# Patient Record
Sex: FEMALE | Race: WHITE | Employment: UNEMPLOYED | URBAN - METROPOLITAN AREA
[De-identification: names, ages, dates, MRNs, and addresses within clinical notes are randomized per-mention and may not be internally consistent; named-entity substitution may affect disease eponyms.]

---

## 2019-05-28 ENCOUNTER — HOSPITAL ENCOUNTER (INPATIENT)
Age: 64
LOS: 3 days | Discharge: NURSING, REHAB OR LONG-TERM CARE | DRG: 426 | End: 2019-05-31
Attending: INTERNAL MEDICINE | Admitting: INTERNAL MEDICINE
Payer: MEDICAID

## 2019-05-28 ENCOUNTER — APPOINTMENT (OUTPATIENT)
Dept: CT IMAGING | Age: 64
DRG: 426 | End: 2019-05-28
Payer: MEDICAID

## 2019-05-28 DIAGNOSIS — E87.1 HYPONATREMIA: Primary | ICD-10-CM

## 2019-05-28 DIAGNOSIS — S09.90XA CLOSED HEAD INJURY, INITIAL ENCOUNTER: ICD-10-CM

## 2019-05-28 DIAGNOSIS — K72.00 ACUTE LIVER FAILURE WITHOUT HEPATIC COMA: ICD-10-CM

## 2019-05-28 PROBLEM — K21.9 GERD (GASTROESOPHAGEAL REFLUX DISEASE): Status: ACTIVE | Noted: 2019-05-28

## 2019-05-28 PROBLEM — F10.10 ALCOHOL ABUSE: Status: ACTIVE | Noted: 2019-05-28

## 2019-05-28 PROBLEM — R79.89 LFTS ABNORMAL: Status: ACTIVE | Noted: 2019-05-28

## 2019-05-28 PROBLEM — I10 ESSENTIAL HYPERTENSION: Status: ACTIVE | Noted: 2019-05-28

## 2019-05-28 LAB
ALBUMIN SERPL-MCNC: 2.7 G/DL (ref 3.5–4.6)
ALP BLD-CCNC: 538 U/L (ref 40–130)
ALT SERPL-CCNC: 129 U/L (ref 0–33)
ANION GAP SERPL CALCULATED.3IONS-SCNC: 19 MEQ/L (ref 9–15)
ANISOCYTOSIS: ABNORMAL
APTT: 28.2 SEC (ref 21.6–35.4)
AST SERPL-CCNC: 142 U/L (ref 0–35)
BASOPHILS ABSOLUTE: 0 K/UL (ref 0–0.2)
BASOPHILS RELATIVE PERCENT: 0.7 %
BILIRUB SERPL-MCNC: 6.2 MG/DL (ref 0.2–0.7)
BILIRUBIN DIRECT: 4.4 MG/DL (ref 0–0.4)
BILIRUBIN, INDIRECT: 1.8 MG/DL (ref 0–0.6)
BUN BLDV-MCNC: 7 MG/DL (ref 8–23)
CALCIUM SERPL-MCNC: 8.3 MG/DL (ref 8.5–9.9)
CHLORIDE BLD-SCNC: 81 MEQ/L (ref 95–107)
CO2: 18 MEQ/L (ref 20–31)
CREAT SERPL-MCNC: 0.28 MG/DL (ref 0.5–0.9)
EKG ATRIAL RATE: 77 BPM
EKG P AXIS: 52 DEGREES
EKG P-R INTERVAL: 168 MS
EKG Q-T INTERVAL: 426 MS
EKG QRS DURATION: 104 MS
EKG QTC CALCULATION (BAZETT): 482 MS
EKG R AXIS: 72 DEGREES
EKG T AXIS: 53 DEGREES
EKG VENTRICULAR RATE: 77 BPM
EOSINOPHILS ABSOLUTE: 0.1 K/UL (ref 0–0.7)
EOSINOPHILS RELATIVE PERCENT: 2.2 %
ETHANOL PERCENT: 0.07 G/DL
ETHANOL: 74 MG/DL (ref 0–0.08)
GFR AFRICAN AMERICAN: >60
GFR NON-AFRICAN AMERICAN: >60
GLOBULIN: 3.2 G/DL (ref 2.3–3.5)
GLUCOSE BLD-MCNC: 93 MG/DL (ref 70–99)
HAV IGM SER IA-ACNC: NORMAL
HCT VFR BLD CALC: 30.1 % (ref 37–47)
HEMOGLOBIN: 10.3 G/DL (ref 12–16)
HEPATITIS B CORE IGM ANTIBODY: NORMAL
HEPATITIS B SURFACE ANTIGEN INTERPRETATION: NORMAL
HEPATITIS C ANTIBODY INTERPRETATION: NORMAL
HEPATITIS INTERPRETATION:: NORMAL
INR BLD: 1.2
LYMPHOCYTES ABSOLUTE: 0.6 K/UL (ref 1–4.8)
LYMPHOCYTES RELATIVE PERCENT: 18.2 %
MACROCYTES: ABNORMAL
MAGNESIUM: 1.7 MG/DL (ref 1.7–2.4)
MCH RBC QN AUTO: 38 PG (ref 27–31.3)
MCHC RBC AUTO-ENTMCNC: 34.1 % (ref 33–37)
MCV RBC AUTO: 111.5 FL (ref 82–100)
MONOCYTES ABSOLUTE: 0.6 K/UL (ref 0.2–0.8)
MONOCYTES RELATIVE PERCENT: 18 %
NEUTROPHILS ABSOLUTE: 2.1 K/UL (ref 1.4–6.5)
NEUTROPHILS RELATIVE PERCENT: 60.9 %
OSMOLALITY: 281 MOSM/KG (ref 280–303)
PDW BLD-RTO: 20.3 % (ref 11.5–14.5)
PLATELET # BLD: 178 K/UL (ref 130–400)
POTASSIUM SERPL-SCNC: 4.5 MEQ/L (ref 3.4–4.9)
PROTHROMBIN TIME: 11.8 SEC (ref 9–11.5)
RBC # BLD: 2.7 M/UL (ref 4.2–5.4)
SLIDE REVIEW: ABNORMAL
SODIUM BLD-SCNC: 118 MEQ/L (ref 135–144)
TOTAL PROTEIN: 5.9 G/DL (ref 6.3–8)
WBC # BLD: 3.4 K/UL (ref 4.8–10.8)

## 2019-05-28 PROCEDURE — 96374 THER/PROPH/DIAG INJ IV PUSH: CPT

## 2019-05-28 PROCEDURE — 36415 COLL VENOUS BLD VENIPUNCTURE: CPT

## 2019-05-28 PROCEDURE — 12032 INTMD RPR S/A/T/EXT 2.6-7.5: CPT

## 2019-05-28 PROCEDURE — 80074 ACUTE HEPATITIS PANEL: CPT

## 2019-05-28 PROCEDURE — 82570 ASSAY OF URINE CREATININE: CPT

## 2019-05-28 PROCEDURE — 85730 THROMBOPLASTIN TIME PARTIAL: CPT

## 2019-05-28 PROCEDURE — G0480 DRUG TEST DEF 1-7 CLASSES: HCPCS

## 2019-05-28 PROCEDURE — 80053 COMPREHEN METABOLIC PANEL: CPT

## 2019-05-28 PROCEDURE — 83735 ASSAY OF MAGNESIUM: CPT

## 2019-05-28 PROCEDURE — 93005 ELECTROCARDIOGRAM TRACING: CPT | Performed by: PHYSICIAN ASSISTANT

## 2019-05-28 PROCEDURE — 82247 BILIRUBIN TOTAL: CPT

## 2019-05-28 PROCEDURE — 2580000003 HC RX 258: Performed by: INTERNAL MEDICINE

## 2019-05-28 PROCEDURE — 99285 EMERGENCY DEPT VISIT HI MDM: CPT

## 2019-05-28 PROCEDURE — 85025 COMPLETE CBC W/AUTO DIFF WBC: CPT

## 2019-05-28 PROCEDURE — 2500000003 HC RX 250 WO HCPCS: Performed by: PHYSICIAN ASSISTANT

## 2019-05-28 PROCEDURE — 84300 ASSAY OF URINE SODIUM: CPT

## 2019-05-28 PROCEDURE — 72125 CT NECK SPINE W/O DYE: CPT

## 2019-05-28 PROCEDURE — 83935 ASSAY OF URINE OSMOLALITY: CPT

## 2019-05-28 PROCEDURE — 82248 BILIRUBIN DIRECT: CPT

## 2019-05-28 PROCEDURE — 6370000000 HC RX 637 (ALT 250 FOR IP): Performed by: INTERNAL MEDICINE

## 2019-05-28 PROCEDURE — 2580000003 HC RX 258: Performed by: PHYSICIAN ASSISTANT

## 2019-05-28 PROCEDURE — 0HQ0XZZ REPAIR SCALP SKIN, EXTERNAL APPROACH: ICD-10-PCS | Performed by: PHYSICIAN ASSISTANT

## 2019-05-28 PROCEDURE — 70450 CT HEAD/BRAIN W/O DYE: CPT

## 2019-05-28 PROCEDURE — 83930 ASSAY OF BLOOD OSMOLALITY: CPT

## 2019-05-28 PROCEDURE — 1210000000 HC MED SURG R&B

## 2019-05-28 PROCEDURE — 6360000002 HC RX W HCPCS: Performed by: PHYSICIAN ASSISTANT

## 2019-05-28 PROCEDURE — 85610 PROTHROMBIN TIME: CPT

## 2019-05-28 RX ORDER — POTASSIUM CHLORIDE 7.45 MG/ML
10 INJECTION INTRAVENOUS PRN
Status: DISCONTINUED | OUTPATIENT
Start: 2019-05-28 | End: 2019-05-31 | Stop reason: HOSPADM

## 2019-05-28 RX ORDER — KETOROLAC TROMETHAMINE 30 MG/ML
30 INJECTION, SOLUTION INTRAMUSCULAR; INTRAVENOUS ONCE
Status: COMPLETED | OUTPATIENT
Start: 2019-05-28 | End: 2019-05-28

## 2019-05-28 RX ORDER — LORAZEPAM 2 MG/ML
1 INJECTION INTRAMUSCULAR
Status: DISCONTINUED | OUTPATIENT
Start: 2019-05-28 | End: 2019-05-31 | Stop reason: HOSPADM

## 2019-05-28 RX ORDER — POTASSIUM CHLORIDE 20 MEQ/1
40 TABLET, EXTENDED RELEASE ORAL PRN
Status: DISCONTINUED | OUTPATIENT
Start: 2019-05-28 | End: 2019-05-31 | Stop reason: HOSPADM

## 2019-05-28 RX ORDER — METOPROLOL TARTRATE 50 MG/1
50 TABLET, FILM COATED ORAL 2 TIMES DAILY
Status: DISCONTINUED | OUTPATIENT
Start: 2019-05-28 | End: 2019-05-31 | Stop reason: HOSPADM

## 2019-05-28 RX ORDER — SODIUM CHLORIDE 0.9 % (FLUSH) 0.9 %
10 SYRINGE (ML) INJECTION PRN
Status: DISCONTINUED | OUTPATIENT
Start: 2019-05-28 | End: 2019-05-31 | Stop reason: HOSPADM

## 2019-05-28 RX ORDER — LORAZEPAM 1 MG/1
4 TABLET ORAL
Status: DISCONTINUED | OUTPATIENT
Start: 2019-05-28 | End: 2019-05-31 | Stop reason: HOSPADM

## 2019-05-28 RX ORDER — CYCLOBENZAPRINE HCL 10 MG
10 TABLET ORAL 3 TIMES DAILY PRN
Status: DISCONTINUED | OUTPATIENT
Start: 2019-05-28 | End: 2019-05-31 | Stop reason: HOSPADM

## 2019-05-28 RX ORDER — METOPROLOL TARTRATE 50 MG/1
50 TABLET, FILM COATED ORAL DAILY
Status: ON HOLD | COMMUNITY
End: 2019-09-12 | Stop reason: HOSPADM

## 2019-05-28 RX ORDER — MULTIVITAMIN WITH FOLIC ACID 400 MCG
1 TABLET ORAL DAILY
Status: DISCONTINUED | OUTPATIENT
Start: 2019-05-28 | End: 2019-05-31 | Stop reason: HOSPADM

## 2019-05-28 RX ORDER — LORAZEPAM 1 MG/1
1 TABLET ORAL
Status: DISCONTINUED | OUTPATIENT
Start: 2019-05-28 | End: 2019-05-31 | Stop reason: HOSPADM

## 2019-05-28 RX ORDER — SODIUM CHLORIDE 0.9 % (FLUSH) 0.9 %
10 SYRINGE (ML) INJECTION EVERY 12 HOURS SCHEDULED
Status: DISCONTINUED | OUTPATIENT
Start: 2019-05-28 | End: 2019-05-31 | Stop reason: HOSPADM

## 2019-05-28 RX ORDER — ONDANSETRON 2 MG/ML
4 INJECTION INTRAMUSCULAR; INTRAVENOUS EVERY 6 HOURS PRN
Status: DISCONTINUED | OUTPATIENT
Start: 2019-05-28 | End: 2019-05-31 | Stop reason: HOSPADM

## 2019-05-28 RX ORDER — LIDOCAINE HYDROCHLORIDE AND EPINEPHRINE 10; 10 MG/ML; UG/ML
20 INJECTION, SOLUTION INFILTRATION; PERINEURAL ONCE
Status: DISCONTINUED | OUTPATIENT
Start: 2019-05-28 | End: 2019-05-31 | Stop reason: HOSPADM

## 2019-05-28 RX ORDER — MAGNESIUM SULFATE 1 G/100ML
1 INJECTION INTRAVENOUS PRN
Status: DISCONTINUED | OUTPATIENT
Start: 2019-05-28 | End: 2019-05-31 | Stop reason: HOSPADM

## 2019-05-28 RX ORDER — LORAZEPAM 2 MG/ML
4 INJECTION INTRAMUSCULAR
Status: DISCONTINUED | OUTPATIENT
Start: 2019-05-28 | End: 2019-05-31 | Stop reason: HOSPADM

## 2019-05-28 RX ORDER — MECLIZINE HCL 12.5 MG/1
12.5 TABLET ORAL 3 TIMES DAILY PRN
COMMUNITY

## 2019-05-28 RX ORDER — LEVOTHYROXINE SODIUM 0.15 MG/1
150 TABLET ORAL DAILY
COMMUNITY

## 2019-05-28 RX ORDER — LIDOCAINE HYDROCHLORIDE AND EPINEPHRINE BITARTRATE 20; .01 MG/ML; MG/ML
20 INJECTION, SOLUTION SUBCUTANEOUS ONCE
Status: COMPLETED | OUTPATIENT
Start: 2019-05-28 | End: 2019-05-28

## 2019-05-28 RX ORDER — 0.9 % SODIUM CHLORIDE 0.9 %
1000 INTRAVENOUS SOLUTION INTRAVENOUS ONCE
Status: COMPLETED | OUTPATIENT
Start: 2019-05-28 | End: 2019-05-29

## 2019-05-28 RX ORDER — PANTOPRAZOLE SODIUM 40 MG/1
40 TABLET, DELAYED RELEASE ORAL
Status: DISCONTINUED | OUTPATIENT
Start: 2019-05-29 | End: 2019-05-31 | Stop reason: HOSPADM

## 2019-05-28 RX ORDER — THIAMINE MONONITRATE (VIT B1) 100 MG
100 TABLET ORAL DAILY
Status: DISCONTINUED | OUTPATIENT
Start: 2019-05-28 | End: 2019-05-31 | Stop reason: HOSPADM

## 2019-05-28 RX ORDER — LORAZEPAM 1 MG/1
2 TABLET ORAL
Status: DISCONTINUED | OUTPATIENT
Start: 2019-05-28 | End: 2019-05-31 | Stop reason: HOSPADM

## 2019-05-28 RX ORDER — LORAZEPAM 1 MG/1
3 TABLET ORAL
Status: DISCONTINUED | OUTPATIENT
Start: 2019-05-28 | End: 2019-05-31 | Stop reason: HOSPADM

## 2019-05-28 RX ORDER — CYCLOBENZAPRINE HCL 5 MG
5 TABLET ORAL 3 TIMES DAILY PRN
Status: ON HOLD | COMMUNITY
End: 2019-08-10 | Stop reason: SDUPTHER

## 2019-05-28 RX ORDER — PANTOPRAZOLE SODIUM 40 MG/1
40 TABLET, DELAYED RELEASE ORAL
COMMUNITY

## 2019-05-28 RX ORDER — ASPIRIN 325 MG
325 TABLET ORAL DAILY
COMMUNITY

## 2019-05-28 RX ORDER — FOLIC ACID 1 MG/1
1 TABLET ORAL DAILY
Status: DISCONTINUED | OUTPATIENT
Start: 2019-05-28 | End: 2019-05-31 | Stop reason: HOSPADM

## 2019-05-28 RX ORDER — LORAZEPAM 2 MG/ML
3 INJECTION INTRAMUSCULAR
Status: DISCONTINUED | OUTPATIENT
Start: 2019-05-28 | End: 2019-05-31 | Stop reason: HOSPADM

## 2019-05-28 RX ORDER — POTASSIUM CHLORIDE 1.5 G/1.77G
40 POWDER, FOR SOLUTION ORAL PRN
Status: DISCONTINUED | OUTPATIENT
Start: 2019-05-28 | End: 2019-05-31 | Stop reason: HOSPADM

## 2019-05-28 RX ORDER — LORAZEPAM 2 MG/ML
2 INJECTION INTRAMUSCULAR
Status: DISCONTINUED | OUTPATIENT
Start: 2019-05-28 | End: 2019-05-31 | Stop reason: HOSPADM

## 2019-05-28 RX ADMIN — LIDOCAINE HYDROCHLORIDE,EPINEPHRINE BITARTRATE 20 ML: 20; .01 INJECTION, SOLUTION INFILTRATION; PERINEURAL at 17:30

## 2019-05-28 RX ADMIN — FOLIC ACID 1 MG: 1 TABLET ORAL at 20:39

## 2019-05-28 RX ADMIN — KETOROLAC TROMETHAMINE 30 MG: 30 INJECTION, SOLUTION INTRAMUSCULAR; INTRAVENOUS at 16:29

## 2019-05-28 RX ADMIN — SODIUM CHLORIDE 1000 ML: 9 INJECTION, SOLUTION INTRAVENOUS at 16:29

## 2019-05-28 RX ADMIN — Medication 10 ML: at 20:51

## 2019-05-28 RX ADMIN — Medication 100 MG: at 20:42

## 2019-05-28 RX ADMIN — METOPROLOL TARTRATE 50 MG: 50 TABLET ORAL at 20:39

## 2019-05-28 RX ADMIN — FOLIC ACID: 5 INJECTION, SOLUTION INTRAMUSCULAR; INTRAVENOUS; SUBCUTANEOUS at 16:29

## 2019-05-28 ASSESSMENT — ENCOUNTER SYMPTOMS
ALLERGIC/IMMUNOLOGIC NEGATIVE: 1
APNEA: 0
VOMITING: 0
BACK PAIN: 1
ABDOMINAL PAIN: 0
DIARRHEA: 0
COLOR CHANGE: 0
EYE PAIN: 0
SHORTNESS OF BREATH: 0
TROUBLE SWALLOWING: 0

## 2019-05-28 ASSESSMENT — PAIN DESCRIPTION - LOCATION
LOCATION: HEAD
LOCATION: HEAD

## 2019-05-28 ASSESSMENT — PAIN SCALES - GENERAL
PAINLEVEL_OUTOF10: 3
PAINLEVEL_OUTOF10: 7
PAINLEVEL_OUTOF10: 6
PAINLEVEL_OUTOF10: 7
PAINLEVEL_OUTOF10: 6

## 2019-05-28 NOTE — ED NOTES
All labs sent but CBCAD. Called lab, spoke with Shauna Conn to get lab draw.       Cristina Harper V RN  05/28/19 [id number]

## 2019-05-28 NOTE — ED PROVIDER NOTES
[facility] ED  eMERGENCYdEPARTMENT eNCOUnter      Pt Name: Sana Albarran  MRN: [id number]  [place] 1955  Date of evaluation: 5/28/2019  Provider:David Deboraha Snellen, PA-C    CHIEF COMPLAINT       Chief Complaint   Patient presents with    Head Injury     fall          HISTORY OF PRESENT ILLNESS  (Location/Symptom, Timing/Onset, Context/Setting, Quality, Duration, Modifying Factors, Severity.)   Sana Albarran is a 59 y.o. female who presents to the emergency department status post fall prior to arrival.  Patient states that she was walking and had a \"dizzy spell\" causing her to fall backwards and striking the back of her head. Patient does not believe that she lost consciousness but says \"who really knows\". Patient does state that she's been having dizzy spells like this for several months and has been evaluated by her family doctor for them and is supposed to be taking meclizine but has not been taking it. The patient denies any chest pain, palpitations or shortness of breath. Patient denies any neck pain. Patient does state that she is having back pain but states that this is a chronic condition for her and denies any new or changing pain to the back. No pain to the extremities. No saddle paresthesias or loss of bowel or bladder control. Rates her headache at a 6 out of 10 currently but denies any nausea or dizziness currently. HPI    Nursing Notes were reviewed and I agree. REVIEW OF SYSTEMS    (2-9 systems for level 4, 10 or more for level 5)     Review of Systems   Constitutional: Negative for diaphoresis and fever. HENT: Negative for hearing loss and trouble swallowing. Eyes: Negative for pain. Respiratory: Negative for apnea and shortness of breath. Cardiovascular: Negative for chest pain. Gastrointestinal: Negative for abdominal pain, diarrhea and vomiting. Endocrine: Negative. Genitourinary: Negative for hematuria. Musculoskeletal: Positive for back pain. Negative for neck pain and neck stiffness. Skin: Negative for color change. Allergic/Immunologic: Negative. Neurological: Positive for dizziness and headaches. Negative for numbness. Hematological: Negative. Psychiatric/Behavioral: Negative. All other systems reviewed and are negative. Except as noted above the remainder of the review of systems was reviewed and negative. PAST MEDICAL HISTORY   No past medical history on file. SURGICAL HISTORY     No past surgical history on file. CURRENT MEDICATIONS       Previous Medications    No medications on file       ALLERGIES     Patient has no known allergies. FAMILY HISTORY     No family history on file.        SOCIAL HISTORY       Social History     Socioeconomic History    Marital status:      Spouse name: Not on file    Number of children: Not on file    Years of education: Not on file    Highest education level: Not on file   Occupational History    Not on file   Social Needs    Financial resource strain: Not on file    Food insecurity:     Worry: Not on file     Inability: Not on file    Transportation needs:     Medical: Not on file     Non-medical: Not on file   Tobacco Use    Smoking status: Not on file   Substance and Sexual Activity    Alcohol use: Not on file    Drug use: Not on file    Sexual activity: Not on file   Lifestyle    Physical activity:     Days per week: Not on file     Minutes per session: Not on file    Stress: Not on file   Relationships    Social connections:     Talks on phone: Not on file     Gets together: Not on file     Attends Congregation service: Not on file     Active member of club or organization: Not on file     Attends meetings of clubs or organizations: Not on file     Relationship status: Not on file    Intimate partner violence:     Fear of current or ex partner: Not on file     Emotionally abused: Not on file     Physically abused: Not on file     Forced sexual

## 2019-05-28 NOTE — ED TRIAGE NOTES
Patient arrived to ER via squad. Per squad patient fell backwards from walking up concrete stairs. Per patient patient fell in kitchen from standing. Large laceration to back of head. Bleeding controlled. Patient is alert and oriented. Patient is a poor historian. History of dizziness per patient. Patient has multiple bruises throughout body in various stages of healing.

## 2019-05-28 NOTE — ED NOTES
Bed: 19  Expected date:   Expected time:   Means of arrival:   Comments:  53F dizziness, fall, head injury, VSS IV and labs     Paco Hardin RN  05/28/19 [id number]

## 2019-05-28 NOTE — H&P
Department of Internal Medicine  History and Physical      CHIEF COMPLAINT: Head Injury (fall )      Reason for Admission:  Hyponatremia [E87.1]  Hyponatremia [E87.1]    History Obtained From: Patient and chart review    HISTORY OF PRESENT ILLNESS:    The patient is a 59 y.o. female with significant past medical history of paroxysmal atrial fibrillation 10 years ago ( s/p ablation), GERD, hypertension who came to the ER after she fell from her wheeled walker with seat and hit back of her head on walkway. She did loose consciousness after the fall. She has laceration at back of her head. Patient was noted to be jaundiced in ER and have elevated LFT. She is chronic alcoholic for more that 30 years now and currently drinks 40-50 ounces of wine everyday. Her sister at bedside states that she is barely moving and walks less than 100 steps per day. She is also noted to have severe hyponatremia. On my interview, patient is AO x3, no chest pain or shortness of breath, no history of hematemesis or melena, abdominal pain. On reviewing her [facility] records, her LFTs had been normal until 2 months ago. She denies any fever, chills but complains of chronic dizziness    Past Medical History:    No past medical history on file. Past Surgical History:    No past surgical history on file. Medications Prior to Admission:    No medications prior to admission. Allergies:  Eggs or egg-derived products and Ursodiol    Social History:   Social History    None         Family History:   No family history on file.     REVIEW OF SYSTEMS:  12 systems reviewed and are negative except as mentioned in HPI and A&P    PHYSICAL EXAM:  Vitals:  Vitals:    05/28/19 1900   BP: 126/77   Pulse: 77   Resp: 18   Temp: 97.3 °F (36.3 °C)   SpO2: 100%       Focal exam:  Jaundiced  Icterus  Liver palpable 3 cm below right costal margin    General Exam (except as mentioned above):  CONSTITUTIONAL: Awake, alert, no apparent distress  EYES:  PERRL, conjunctiva normal  ENT:  Normocephalic, atraumatic  NECK:  Supple  BACK:  Symmetric  LUNGS:  CTAB except bilateral basilar crackles. CARDIOVASCULAR:  S1S2 present  ABDOMEN:  soft, non-distended, non-tender  MUSCULOSKELETAL:  There is no redness, warmth, or swelling of the joints. NEUROLOGIC:  Alert, awake, oriented x 3. No FND  EXTREMITIES: Warm and well perfused. DATA:  LABS  Recent Labs     05/28/19  1537   WBC 3.4*   RBC 2.70*   HGB 10.3*   HCT 30.1*   .5*   MCH 38.0*   MCHC 34.1   RDW 20.3*          Recent Labs     05/28/19  1515   *   K 4.5   CL 81*   CO2 18*   BUN 7*   CREATININE 0.28*   GLUCOSE 93   CALCIUM 8.3*       Recent Labs     05/28/19  1515   MG 1.7       ASSESSMENT AND PLAN:    Active Hospital Problems    Diagnosis Date Noted    Hyponatremia [E87.1] 05/28/2019     Priority: High    Alcohol abuse [F10.10] 05/28/2019    LFTs abnormal [R94.5] 05/28/2019    Essential hypertension [I10] 05/28/2019    GERD (gastroesophageal reflux disease) [K21.9] 05/28/2019     - Hyponatremia: patient received 2 L NS bolus in ER. Will repeat BMP and decide on further treatment plan. Will send blood and urine osmolarity, urine sodium and creatinine. Nephrology consult     - Elevated LFTs: secondary to alcohol use. Will get RUQ US . GI consult. Protonix BID. No signs or symptoms of active bleeding. Patient is afebrile and HDS    - Alcoholic liver disease: Child Suero Class B, MELD score 24.  GI consulted    - Alcohol abuse: [facility] protocol    - Fall: neuro checks every 4 hours , due to concern for LOC after fall      Hoang Uribe MD  Pager : [phone]

## 2019-05-29 ENCOUNTER — APPOINTMENT (OUTPATIENT)
Dept: ULTRASOUND IMAGING | Age: 64
DRG: 426 | End: 2019-05-29
Payer: MEDICAID

## 2019-05-29 LAB
ALBUMIN SERPL-MCNC: 2.3 G/DL (ref 3.5–4.6)
ALP BLD-CCNC: 498 U/L (ref 40–130)
ALT SERPL-CCNC: 106 U/L (ref 0–33)
ANION GAP SERPL CALCULATED.3IONS-SCNC: 11 MEQ/L (ref 9–15)
ANION GAP SERPL CALCULATED.3IONS-SCNC: 13 MEQ/L (ref 9–15)
ANISOCYTOSIS: ABNORMAL
AST SERPL-CCNC: 114 U/L (ref 0–35)
BASOPHILS ABSOLUTE: 0 K/UL (ref 0–0.2)
BASOPHILS RELATIVE PERCENT: 0.8 %
BILIRUB SERPL-MCNC: 6.1 MG/DL (ref 0.2–0.7)
BILIRUBIN DIRECT: 5.5 MG/DL (ref 0–0.4)
BILIRUBIN, INDIRECT: 0.6 MG/DL (ref 0–0.6)
BUN BLDV-MCNC: 8 MG/DL (ref 8–23)
BUN BLDV-MCNC: 8 MG/DL (ref 8–23)
CALCIUM SERPL-MCNC: 7.3 MG/DL (ref 8.5–9.9)
CALCIUM SERPL-MCNC: 7.6 MG/DL (ref 8.5–9.9)
CHLORIDE BLD-SCNC: 90 MEQ/L (ref 95–107)
CHLORIDE BLD-SCNC: 90 MEQ/L (ref 95–107)
CO2: 21 MEQ/L (ref 20–31)
CO2: 23 MEQ/L (ref 20–31)
CREAT SERPL-MCNC: 0.42 MG/DL (ref 0.5–0.9)
CREAT SERPL-MCNC: 0.44 MG/DL (ref 0.5–0.9)
CREATININE URINE: 46.4 MG/DL
EOSINOPHILS ABSOLUTE: 0.1 K/UL (ref 0–0.7)
EOSINOPHILS RELATIVE PERCENT: 2.2 %
GFR AFRICAN AMERICAN: >60
GFR AFRICAN AMERICAN: >60
GFR NON-AFRICAN AMERICAN: >60
GFR NON-AFRICAN AMERICAN: >60
GLUCOSE BLD-MCNC: 103 MG/DL (ref 70–99)
GLUCOSE BLD-MCNC: 78 MG/DL (ref 70–99)
HCT VFR BLD CALC: 27.6 % (ref 37–47)
HEMOGLOBIN: 9.5 G/DL (ref 12–16)
HYPOCHROMIA: ABNORMAL
LYMPHOCYTES ABSOLUTE: 0.8 K/UL (ref 1–4.8)
LYMPHOCYTES RELATIVE PERCENT: 26.4 %
MACROCYTES: ABNORMAL
MAGNESIUM: 1.7 MG/DL (ref 1.7–2.4)
MCH RBC QN AUTO: 38.1 PG (ref 27–31.3)
MCHC RBC AUTO-ENTMCNC: 34.4 % (ref 33–37)
MCV RBC AUTO: 110.7 FL (ref 82–100)
MONOCYTES ABSOLUTE: 0.4 K/UL (ref 0.2–0.8)
MONOCYTES RELATIVE PERCENT: 13.9 %
NEUTROPHILS ABSOLUTE: 1.8 K/UL (ref 1.4–6.5)
NEUTROPHILS RELATIVE PERCENT: 56.7 %
OSMOLALITY URINE: 177 MOSM/KG (ref 300–900)
PDW BLD-RTO: 19.9 % (ref 11.5–14.5)
PLATELET # BLD: 175 K/UL (ref 130–400)
POIKILOCYTES: ABNORMAL
POLYCHROMASIA: ABNORMAL
POTASSIUM SERPL-SCNC: 3.9 MEQ/L (ref 3.4–4.9)
POTASSIUM SERPL-SCNC: 4.7 MEQ/L (ref 3.4–4.9)
RBC # BLD: 2.5 M/UL (ref 4.2–5.4)
SODIUM BLD-SCNC: 124 MEQ/L (ref 135–144)
SODIUM BLD-SCNC: 124 MEQ/L (ref 135–144)
SODIUM URINE: <20 MEQ/L
STOMATOCYTES: ABNORMAL
TOTAL PROTEIN: 4.9 G/DL (ref 6.3–8)
WBC # BLD: 3.1 K/UL (ref 4.8–10.8)

## 2019-05-29 PROCEDURE — 93010 ELECTROCARDIOGRAM REPORT: CPT | Performed by: INTERNAL MEDICINE

## 2019-05-29 PROCEDURE — 36415 COLL VENOUS BLD VENIPUNCTURE: CPT

## 2019-05-29 PROCEDURE — 97162 PT EVAL MOD COMPLEX 30 MIN: CPT

## 2019-05-29 PROCEDURE — 2580000003 HC RX 258: Performed by: INTERNAL MEDICINE

## 2019-05-29 PROCEDURE — 97165 OT EVAL LOW COMPLEX 30 MIN: CPT

## 2019-05-29 PROCEDURE — 83735 ASSAY OF MAGNESIUM: CPT

## 2019-05-29 PROCEDURE — 85025 COMPLETE CBC W/AUTO DIFF WBC: CPT

## 2019-05-29 PROCEDURE — 99253 IP/OBS CNSLTJ NEW/EST LOW 45: CPT | Performed by: INTERNAL MEDICINE

## 2019-05-29 PROCEDURE — 1210000000 HC MED SURG R&B

## 2019-05-29 PROCEDURE — 80048 BASIC METABOLIC PNL TOTAL CA: CPT

## 2019-05-29 PROCEDURE — 76705 ECHO EXAM OF ABDOMEN: CPT

## 2019-05-29 PROCEDURE — 97530 THERAPEUTIC ACTIVITIES: CPT

## 2019-05-29 PROCEDURE — 6370000000 HC RX 637 (ALT 250 FOR IP): Performed by: INTERNAL MEDICINE

## 2019-05-29 PROCEDURE — 80076 HEPATIC FUNCTION PANEL: CPT

## 2019-05-29 RX ORDER — ACETAMINOPHEN 325 MG/1
650 TABLET ORAL EVERY 4 HOURS PRN
Status: DISCONTINUED | OUTPATIENT
Start: 2019-05-29 | End: 2019-05-31 | Stop reason: HOSPADM

## 2019-05-29 RX ADMIN — ACETAMINOPHEN 650 MG: 325 TABLET ORAL at 10:32

## 2019-05-29 RX ADMIN — Medication 10 ML: at 00:04

## 2019-05-29 RX ADMIN — Medication 10 ML: at 21:04

## 2019-05-29 RX ADMIN — FOLIC ACID 1 MG: 1 TABLET ORAL at 10:33

## 2019-05-29 RX ADMIN — PANTOPRAZOLE SODIUM 40 MG: 40 TABLET, DELAYED RELEASE ORAL at 16:07

## 2019-05-29 RX ADMIN — Medication 100 MG: at 10:32

## 2019-05-29 RX ADMIN — ACETAMINOPHEN 650 MG: 325 TABLET ORAL at 22:11

## 2019-05-29 RX ADMIN — Medication 10 ML: at 10:33

## 2019-05-29 RX ADMIN — CYCLOBENZAPRINE HYDROCHLORIDE 10 MG: 10 TABLET, FILM COATED ORAL at 06:53

## 2019-05-29 RX ADMIN — METOPROLOL TARTRATE 50 MG: 50 TABLET ORAL at 10:33

## 2019-05-29 RX ADMIN — THERA TABS 1 TABLET: TAB at 10:33

## 2019-05-29 RX ADMIN — PANTOPRAZOLE SODIUM 40 MG: 40 TABLET, DELAYED RELEASE ORAL at 06:49

## 2019-05-29 RX ADMIN — ACETAMINOPHEN 650 MG: 325 TABLET ORAL at 16:07

## 2019-05-29 ASSESSMENT — PAIN SCALES - GENERAL
PAINLEVEL_OUTOF10: 4
PAINLEVEL_OUTOF10: 7
PAINLEVEL_OUTOF10: 4
PAINLEVEL_OUTOF10: 7

## 2019-05-29 ASSESSMENT — PAIN DESCRIPTION - LOCATION: LOCATION: BACK

## 2019-05-29 NOTE — CONSULTS
CD note: Client isn't receptive to CD services. Shared that she believes she has an issue with her alcohol use and does plan to stop but for right now her main concern is to follow up with a rehab center so she can physically get built up and then may consider attending AA meetings(schedule given) and possibly CD treatment if she makes that decision. No prior CD treatments. Drinking 3 to 5 glasses of wine ranging from 5 ounce to 9 ounce glasses daily with last use 5/26/2019. Denies any other alcohol beverage intake, denies any other substance use. Client given list of CD treatment centers where she can access treatment with client stating understanding. Case discussed with clients nurse Sravanthi Reyes and  Estelle Ott. Thank You.

## 2019-05-29 NOTE — CONSULTS
[facility]  Consult [facility]           Reason for Consult:  hyponatremia  Requesting Physician:  Dr. Debbie Carrasquillo    Chief Complaint:  Fall, head injury  History Obtained From:  patient, electronic medical record    History of Present Ilness:    59y.o. year old female with history s/f A.fib s/p ablation, GERD, HTN, hypothyroidism and EtOH use who presented to the ED after she fell from her wheelchair and hit her head causing a laceration at the back of her head and LOC. On presentation found to have Na 814, metabolic acidosis, transaminitis, hypoalbuminemia. Given 2L in the ED. Nml renal function. Pt takes aleve BID. Past Medical History:        Diagnosis Date    Asthma     Atrial fibrillation (HCC)     DVT (deep venous thrombosis) (HCC)     GERD (gastroesophageal reflux disease)     Hypertension     Status post tubal ligation     Thyroid disease     Hypothyroid       Past Surgical History:        Procedure Laterality Date    ATRIAL ABLATION SURGERY      BACK SURGERY      HYSTEROSCOPY      VARICOSE VEIN SURGERY         Home Medications:    No current facility-administered medications on file prior to encounter.       Current Outpatient Medications on File Prior to Encounter   Medication Sig Dispense Refill    aspirin 325 MG tablet Take 325 mg by mouth daily      levothyroxine (SYNTHROID) 150 MCG tablet Take 150 mcg by mouth Daily      meclizine (ANTIVERT) 12.5 MG tablet Take 12.5 mg by mouth 3 times daily as needed      metoprolol tartrate (LOPRESSOR) 50 MG tablet Take 50 mg by mouth daily      pantoprazole (PROTONIX) 40 MG tablet Take 40 mg by mouth every morning (before breakfast)      cyclobenzaprine (FLEXERIL) 5 MG tablet Take 5 mg by mouth 3 times daily as needed for Muscle spasms      Naproxen Sodium (ALEVE PO) Take by mouth         Allergies:  Eggs or egg-derived products and Ursodiol    Social History:    Social History     Socioeconomic History    Marital status:      Spouse name: Not on file    Number of children: Not on file    Years of education: Not on file    Highest education level: Not on file   Occupational History    Not on file   Social Needs    Financial resource strain: Not on file    Food insecurity:     Worry: Not on file     Inability: Not on file    Transportation needs:     Medical: Not on file     Non-medical: Not on file   Tobacco Use    Smoking status: Never Smoker    Smokeless tobacco: Never Used   Substance and Sexual Activity    Alcohol use: Yes     Comment: Drinks daily sometime 3 sometimes 5    Drug use: Not Currently    Sexual activity: Not Currently   Lifestyle    Physical activity:     Days per week: Not on file     Minutes per session: Not on file    Stress: Not on file   Relationships    Social connections:     Talks on phone: Not on file     Gets together: Not on file     Attends Yazdanism service: Not on file     Active member of club or organization: Not on file     Attends meetings of clubs or organizations: Not on file     Relationship status: Not on file    Intimate partner violence:     Fear of current or ex partner: Not on file     Emotionally abused: Not on file     Physically abused: Not on file     Forced sexual activity: Not on file   Other Topics Concern    Not on file   Social History Narrative    Not on file       Family History:   History reviewed. No pertinent family history.     Review of Systems:   Positives in bold  Constitutional: fever, chills, fatigue, malaise   HENT:  rhinorrhea, sinus pain, sore throat, epistaxis    Eyes:  photophobia, visual disturbance, eye redness  Respiratory: shortness of breath, cough, hemoptysis    Cardiovascular: chest pain, palpitations, orthopnea, leg swelling   Gastrointestinal: abdominal pain, nausea, vomiting, diarrhea, constipation   Endocrine: polydipsia, polyphagia, cold intolerance, heat intolerance  Genitourinary: dysuria, flank pain, frequency, urgency   Hematologic: easy likely consistent with chronic small vessel disease. Old left subcortical frontal lacunar infarct. No acute intra-axial or extra-axial findings. The visualized osseous structures are unremarkable. The visualized portion of the paranasal sinuses, and mastoids are unremarkable. NO ACUTE INTRA-AXIAL OR EXTRA-AXIAL FINDINGS. All CT scans at this facility use dose modulation, iterative reconstruction, and/or weight based dosing when appropriate to reduce radiation dose to as low as reasonably achievable. Ct Cervical Spine Wo Contrast    Result Date: 5/28/2019  CT CERVICAL SPINE WO CONTRAST CLINICAL HISTORY:  fall COMPARISON: NONE Findings: Multiple serial axial images of the cervical spine from the base of the skull through the upper thoracic vertebra with both sagittal and coronal reconstructions was performed. There is straightening of the normal expected cervical lordosis. There is multilevel degenerative joint disease. Prevertebral  soft tissues are  unremarkable. The disk spaces are fell and hit back of head narrowed at the C5-C6 level. No acute fractures or spondylo-listhesis. .     NO ACUTE FRACTURES. All CT scans at this facility use dose modulation, iterative reconstruction, and/or weight based dosing when appropriate to reduce radiation dose to as low as reasonably achievable. Us Abdomen Limited    Result Date: 5/29/2019  US ABDOMEN LIMITED : 5/28/2019 CLINICAL HISTORY:  RUQ pain, elevated alkphos and bilirubin . COMPARISON: None available. TECHNIQUE: Transabdominal ultrasound of the right upper quadrant was performed. FINDINGS: The study is markedly limited by the patient's moderately enlarged heterogeneous echogenic liver, which may reflect fatty infiltration, cirrhosis and/or hepatitis. The gallbladder is moderately distended with moderate wall thickening, which is probably reactive secondary to metabolic derangement rather than a calculus cholecystitis.  There is no significant ascites, biliary

## 2019-05-29 NOTE — CONSULTS
Consult to Gastroenterology  Consult performed by: lAaina Albarran MD  Consult ordered by: George Baugh MD          Patient Name: Jeffrey Hall Date: 2019  3:08 PM  MR #: [id number]  : 1955    Attending Physician: Ed Joshua DO  Reason for consult: Elevated liver enzymes    History of Presenting Illness:      Chanel Mao is a 59 y.o. female on hospital day 1 with a history of AF, DVT, falls, GERD, HTN. History Obtained From:  Patient and RN  Patient reports recently she has been feeling dizzy and has had recent falls at home. She reports she drinks about 3-4 glasses of wine most days of the week for about ten years. She does not smoke. She is cira vague on providing a history. She reports she sees a GI doctor in July for her GERD and occasional dysphagia to solids and pills. She reports she has been experiencing dysphagia for the past town months. She denies  unintentional weight loss or loss of appetite. Patient lives alone. RN reports the patient's sister is in town from [place] due to the recent death of there mother. The sister reports the patient drinks 2-3 bottles of wine daily along with shots of liquor throughout the day. The sister reports that her sisters health and mobility has declined in the past year. The sister reports that the patient barely can walk 100 steps without getting dizzy and tired. She is incontinent of both stool and urine. Sister feels it is unsafe for her sister to live alone. Patient denies abdominal pain, N/V. She denies diarrhea, constipation, or bleeding. She denies chest pain, shortness of breath, or palpitations. Patient had a recent fall on Monday, requiring  sutures in the back of her head. He has bruises on her left eye and neck. CIWA scale was started last night. Patient has not required any medication from this.      History:      Past Medical History:   Diagnosis Date    Asthma     Atrial fibrillation ([address]     DVT (deep venous thrombosis) (HCC)     GERD (gastroesophageal reflux disease)     Hypertension     Status post tubal ligation     Thyroid disease     Hypothyroid     Past Surgical History:   Procedure Laterality Date    ATRIAL ABLATION SURGERY      BACK SURGERY      HYSTEROSCOPY      VARICOSE VEIN SURGERY       Family History  History reviewed. No pertinent family history.   Social History     Socioeconomic History    Marital status:      Spouse name: Not on file    Number of children: Not on file    Years of education: Not on file    Highest education level: Not on file   Occupational History    Not on file   Social Needs    Financial resource strain: Not on file    Food insecurity:     Worry: Not on file     Inability: Not on file    Transportation needs:     Medical: Not on file     Non-medical: Not on file   Tobacco Use    Smoking status: Never Smoker    Smokeless tobacco: Never Used   Substance and Sexual Activity    Alcohol use: Yes     Comment: Drinks daily sometime 3 sometimes 5    Drug use: Not Currently    Sexual activity: Not Currently   Lifestyle    Physical activity:     Days per week: Not on file     Minutes per session: Not on file    Stress: Not on file   Relationships    Social connections:     Talks on phone: Not on file     Gets together: Not on file     Attends Jew service: Not on file     Active member of club or organization: Not on file     Attends meetings of clubs or organizations: Not on file     Relationship status: Not on file    Intimate partner violence:     Fear of current or ex partner: Not on file     Emotionally abused: Not on file     Physically abused: Not on file     Forced sexual activity: Not on file   Other Topics Concern    Not on file   Social History Narrative    Not on file      Home Medications:      Medications Prior to Admission: aspirin 325 MG tablet, Take 325 mg by mouth daily  levothyroxine (SYNTHROID) 150 MCG tablet, Take 150 mcg by mouth Daily  meclizine (ANTIVERT) 12.5 MG tablet, Take 12.5 mg by mouth 3 times daily as needed  metoprolol tartrate (LOPRESSOR) 50 MG tablet, Take 50 mg by mouth daily  pantoprazole (PROTONIX) 40 MG tablet, Take 40 mg by mouth every morning (before breakfast)  cyclobenzaprine (FLEXERIL) 5 MG tablet, Take 5 mg by mouth 3 times daily as needed for Muscle spasms  Naproxen Sodium (ALEVE PO), Take by mouth    Current Hospital Medications:   Scheduled Meds:   lidocaine-EPINEPHrine  20 mL Intradermal Once    metoprolol tartrate  50 mg Oral BID    pantoprazole  40 mg Oral BID AC    sodium chloride flush  10 mL Intravenous 2 times per day    sodium chloride flush  10 mL Intravenous 2 times per day    thiamine  100 mg Oral Daily    folic acid  1 mg Oral Daily    multivitamin  1 tablet Oral Daily     Continuous Infusions:  PRN Meds:.acetaminophen, cyclobenzaprine, sodium chloride flush, magnesium hydroxide, ondansetron, potassium chloride **OR** potassium alternative oral replacement **OR** potassium chloride, magnesium sulfate, sodium chloride flush, LORazepam **OR** LORazepam **OR** LORazepam **OR** LORazepam **OR** LORazepam **OR** LORazepam **OR** LORazepam **OR** LORazepam     Allergies: Allergies   Allergen Reactions    Eggs Or Egg-Derived Products     Ursodiol       Review of Systems:       [x] CV, Resp, Neuro, , and all other systems reviewed and negative other than listed in HPI. Objective Findings:     Vitals:   Vitals:    05/28/19 2107 05/28/19 2134 05/29/19 0415 05/29/19 0800   BP:  113/67 125/74 115/72   Pulse: 80 82 82 82   Resp:    16   Temp:   98.4 °F (36.9 °C) 98.2 °F (36.8 °C)   TempSrc:    Oral   SpO2:   97% 99%   Weight:       Height:          Physical Examination:  General: No apparent distress, alert and oriented  HEENT: Normocephalic, no scleral icterus. Neck: No JVD. Heart: Regular, no murmur, no rub/gallop. No RV heave. Lungs: Clear to ascultation, no rales/wheezing/rhonchi.  Good chest wall excursion. Abdomen: Appearance:, Distension -none, Soft , tenderness -none, Bowel sounds normal   Extremities: No clubbing/cyanosis, no edema. Skin: Warm, dry, normal turgor, no rash, + bruise, no petichiae. Neuro: No myoclonus or tremor. Psych: Normal affect    Results/ Medications reviewed 5/29/2019, 10:29 AM     Laboratory, Microbiology, Pathology, Radiology, Cardiology, Medications and Transcriptions reviewed  Scheduled Meds:   lidocaine-EPINEPHrine  20 mL Intradermal Once    metoprolol tartrate  50 mg Oral BID    pantoprazole  40 mg Oral BID AC    sodium chloride flush  10 mL Intravenous 2 times per day    sodium chloride flush  10 mL Intravenous 2 times per day    thiamine  100 mg Oral Daily    folic acid  1 mg Oral Daily    multivitamin  1 tablet Oral Daily     Continuous Infusions:    Recent Labs     05/28/19  1537 05/29/19  0536   WBC 3.4* 3.1*   HGB 10.3* 9.5*   HCT 30.1* 27.6*   .5* 110.7*    175     Recent Labs     05/28/19  1515 05/29/19  0536   * 124*   K 4.5 3.9   CL 81* 90*   CO2 18* 21   BUN 7* 8   CREATININE 0.28* 0.44*     Recent Labs     05/28/19  1515 05/29/19  0536   * 114*   * 106*   BILIDIR 4.4* 5.5*   BILITOT 6.2* 6.1*   ALKPHOS 538* 498*     No results for input(s): LIPASE, AMYLASE in the last 72 hours. Recent Labs     05/28/19  1515 05/29/19  0536   PROT 5.9* 4.9*   INR 1.2  --      Ct Head Wo Contrast  Result Date: 5/28/2019  NO ACUTE INTRA-AXIAL OR EXTRA-AXIAL FINDINGS. Ct Cervical Spine Wo Contrast  Result Date: 5/28/2019  NO ACUTE FRACTURES. Endoscopic evaluations: Colonoscopy 2012- reticulosis in the sigmoid colon. The entire examined colon is normal.    Impression:   52-year-old female patient with recurrent falls at home. It was noted to admission the patient's liver enzymes were elevated. Patient has a history of alcohol abuse. Liver function tests are trending down.   , /, bilirubin 6.1, albumin 2.3, total protein 4.9. INR 1.2, platelets 216. Patient's baseline hemoglobin is 12. Her hemoglobin today is 9.5, .7. No overt signs of active GI bleed. However, patient did fall on Monday requiring sutures to her head. He also has ecchymotic areas on her face and neck. Liver function tests done in March, 2019 at the [facility] were unremarkable other than her ALT was 45. Hepatitis panel nonreactive. Abdominal ultrasound performed today. Awaiting report. Patient also with reports of dysphagia and worsening GERD. Differential diagnosis: esophageal spasms related to acid reflux, esophagitis, stricture, malignancy. Will postpone upper endoscopy until patient is more alert and no withdraw symptoms are present. Clinical presentation and elevated liver function tests likely elevated due to alcoholic hepatitis. Plan:   - Complete alcohol abstinence   - Continue course of care  - Monitor LFT daily  - Monitor CBC daily  - Daily PPI/ Antireflux regimen  - Continue [facility] protocol   - Nutrition consult  - Await US report  Comments: Thank you for allowing us to participate in the care of this patient. Will continue to follow. Please call if questions or concerns arise. Electronically signed by Roderick España MD on 5/29/2019 at 10:29 AM    Please note this report has been partially produced using speech recognition software and may cause contain errors related to that system including grammar, punctuation and spelling as well as words and phrases that may seem inappropriate. If there are questions or concerns please feel free to contact me to clarify.

## 2019-05-29 NOTE — FLOWSHEET NOTE
Chemical Dependency Consult placed per Dr David Shay orders, notified  on [place] of consult, she states she will make [facility] aware.

## 2019-05-29 NOTE — PROGRESS NOTES
[facility] OCCUPATIONAL THERAPY EVALUATION - ACUTE     Date: 2019  Patient Name: Moni Carter        MRN: [id number]  Account: [de-identified]   : 1955  (59 y.o.)  Room: [place] [address]    Chart Review:  Diagnosis:  The primary encounter diagnosis was Hyponatremia. Diagnoses of Closed head injury, initial encounter and Acute liver failure without hepatic coma were also pertinent to this visit. Past Medical History:   Diagnosis Date    Asthma     Atrial fibrillation ([address]     DVT (deep venous thrombosis) (HCC)     GERD (gastroesophageal reflux disease)     Hypertension     Status post tubal ligation     Thyroid disease     Hypothyroid     Past Surgical History:   Procedure Laterality Date    ATRIAL ABLATION SURGERY      BACK SURGERY      HYSTEROSCOPY      VARICOSE VEIN SURGERY         Restrictions  Restrictions/Precautions: Fall Risk, Seizure     Safety Devices: Safety Devices  Safety Devices in place: Yes  Type of devices: All fall risk precautions in place    Subjective       Pain Reassessment:   Pain Assessment  Patient Currently in Pain: Yes(Simultaneous filing. User may not have seen previous data.)  Pain Assessment: 0-10  Pain Level: 7(Simultaneous filing.  User may not have seen previous data.)       Orientation  Orientation  Overall Orientation Status: Within Functional Limits    Prior Level of Function:  Social/Functional History  Lives With: Alone  Type of Home: House  Home Layout: Two level, Bed/Bath upstairs(sleeps in recliner on 1st floor sometimes, half bathroom on 1st floor)  Home Access: Stairs to enter with rails  Entrance Stairs - Number of Steps: 5  Bathroom Equipment: Grab bars in shower, Shower chair  Home Equipment: 4 wheeled walker, Cane  ADL Assistance: Independent  Homemaking Assistance: Independent  Ambulation Assistance: Independent(rollator or cane \"whichever is closer\")  Transfer Assistance: Independent  Active : Yes    OBJECTIVE:     Orientation 62y/o F admitted for CHI s/p fall and dizziness, pt found to have above deficits and said she's been getting dizzy few months. Pt would bebfit from therapy  Performance deficits / Impairments: Decreased functional mobility , Decreased endurance, Decreased ADL status, Decreased safe awareness, Decreased ROM, Decreased balance, Decreased high-level IADLs  Prognosis: Good  Discharge Recommendations: Continue to assess pending progress  History: multi comorb    Six Click Score   How much help for putting on and taking off regular lower body clothing?: A Little  How much help for Bathing?: A Little  How much help for Toileting?: A Little  How much help for putting on and taking off regular upper body clothing?: A Little  How much help for taking care of personal grooming?: A Little  How much help for eating meals?: A Little  AM-[facility] Inpatient Daily Activity Raw Score: 18  AM-[facility] Inpatient ADL T-Scale Score : 38.66  ADL Inpatient CMS 0-100% Score: 46.65    Plan:  Plan  Times per week: 1-3x  Plan weeks: for length of acute stay  Current Treatment Recommendations: Patient/Caregiver Education & Training, Endurance Training, Balance Training, Safety Education & Training, Self-Care / ADL    Goals:   Patient will:    - Improve functional endurance to tolerate/complete 30 mins of ADL's  - Be Ind in UB ADLs   - Be Ind in LB ADLs  - Be Ind in ADL transfers without LOB  - Be Ind in toileting tasks  - Access appropriate D/C site with as few architectural barriers as possible. Patient Goal:    D/C home   Discussed and agreed upon: Yes Comments:     Therapy Time:   OT Individual Minutes  Time In: 8990  Time Out: 5510  Minutes: 20  Tx:  Pt educated in AD needs and use for bathroom safety. Pt instructed in safety with ADLs and functional transfers.    Electronically signed by:    XOCHITL Skaggs  5/29/2019, 4:13 PM

## 2019-05-29 NOTE — CARE COORDINATION
RACHELLEW met with sister/ZANDER Palma, she stated Pt lives in [place] but has not been home in 2 mos because she is staying her mothers home with sister. Mother passed 2 mo ago. Pt does not drive. Sister stated that Pt drinks and does not think she will stop drinking. SNF list was given to Sister but she stated she will not talk to her that she will leave it up to LSW. Lord Rob Rivas Electronically signed by EMMANUEL Joy on 5/29/2019 at 2:19 PM

## 2019-05-29 NOTE — PROGRESS NOTES
05/29/19 0653    sodium chloride flush 0.9 % injection 10 mL  10 mL Intravenous 2 times per day Anish Lugo MD   10 mL at 05/29/19 0004    sodium chloride flush 0.9 % injection 10 mL  10 mL Intravenous PRN Anish Lugo MD        magnesium hydroxide (MILK OF MAGNESIA) 400 MG/5ML suspension 30 mL  30 mL Oral Daily PRN Anish Lugo MD        ondansetron (ZOFRAN) injection 4 mg  4 mg Intravenous Q6H PRN Anish Lugo MD        potassium chloride (KLOR-CON M) extended release tablet 40 mEq  40 mEq Oral PRN Anish Lugo MD        Or    potassium chloride (KLOR-CON) packet 40 mEq  40 mEq Oral PRN Anish Guerrero MD        Or    potassium chloride 10 mEq/100 mL IVPB (Peripheral Line)  10 mEq Intravenous PRN Anish Lugo MD        magnesium sulfate 1 g in dextrose 5% 100 mL IVPB  1 g Intravenous PRN Anish Lugo MD        sodium chloride flush 0.9 % injection 10 mL  10 mL Intravenous 2 times per day Anish Lugo MD   10 mL at 05/29/19 1033    sodium chloride flush 0.9 % injection 10 mL  10 mL Intravenous PRN Anish Lugo MD        vitamin B-1 (THIAMINE) tablet 100 mg  100 mg Oral Daily Anish Lugo MD   100 mg at 47/21/44 6283    folic acid (FOLVITE) tablet 1 mg  1 mg Oral Daily Anish Lugo MD   1 mg at 05/29/19 1033    LORazepam (ATIVAN) tablet 1 mg  1 mg Oral Q1H PRN Anish Guerrero MD        Or    LORazepam (ATIVAN) injection 1 mg  1 mg Intravenous Q1H PRN Anish Guerrero MD        Or    LORazepam (ATIVAN) tablet 2 mg  2 mg Oral Q1H PRN Anish Guerrero MD        Or    LORazepam (ATIVAN) injection 2 mg  2 mg Intravenous Q1H PRN Anish Guerrero MD        Or    LORazepam (ATIVAN) tablet 3 mg  3 mg Oral Q1H PRN Anish Guerrero MD        Or    LORazepam (ATIVAN) injection 3 mg  3 mg Intravenous Q1H PRN Anish Guerrero MD        Or    LORazepam (ATIVAN) tablet 4 mg  4 mg Oral Q1H PRN Anish Guerrero MD        Or    LORazepam (ATIVAN) injection 4 mg  4 mg Intravenous Q1H PRN Anish Lugo MD        multivitamin

## 2019-05-29 NOTE — CARE COORDINATION
[address] [place] [address] has a private room cost around $390.00 a day with 15 days up front or [facility] $244.00 a day with 30 days up front. RACHELLEW has asked the [facility] if they can do 15days. Waiting for a answer. .Electronically signed by EMMANUEL Silver on 5/29/2019 at 3:35 PM

## 2019-05-29 NOTE — PROGRESS NOTES
Received consult  Na 118 but alcohol level 74 mg/dL (osmolality is pending but given alcohol level, patient may not be hypoosmolar)  Got 2 liters NS  Repeat labs ordered including urine na and osmolality  Will ask to contact me with repeat labs and will make adjustments accordingly

## 2019-05-29 NOTE — PROGRESS NOTES
Independent  Homemaking Assistance: Independent  Ambulation Assistance: Independent(rollator or cane \"whichever is closer\")  Transfer Assistance: Independent  Active : Yes    OBJECTIVE:   Vision/Hearing:  Vision: Within Functional Limits  Hearing: Within functional limits    Cognition:  Overall Orientation Status: Within Functional Limits  Follows Commands: Within Functional Limits    Observation/Palpation  Observation: yellow color to whites of eyes    ROM:  RLE AROM: WFL  LLE AROM : WFL    Strength:  Strength RLE  Strength RLE: WFL  Strength LLE  Strength LLE: WFL    Neuro:  Balance  Sitting - Static: Good  Sitting - Dynamic: Good  Standing - Static: Fair  Standing - Dynamic: Fair;-             Bed mobility  Supine to Sit: Supervision  Sit to Supine: Supervision    Transfers  Sit to Stand: Contact guard assistance;Minimal Assistance(min A from surfaces that don't have arms or GBs)  Stand to sit: Contact guard assistance  Bed to Chair: Contact guard assistance    Ambulation  Ambulation?: Yes  Ambulation 1  Surface: level tile  Device: Rolling Walker  Assistance: Contact guard assistance  Quality of Gait: flexed posture, quick pace  Gait Deviations: Decreased step height  Distance: 30 ft x2  Comments: 1 seated rest break due to sudden dizziness, pt tends to leave walker behind for final few steps and pivot to seated destination    Activity Tolerance  Activity Tolerance: Patient Tolerated treatment well          ASSESSMENT:   Body structures, Functions, Activity limitations: Decreased functional mobility ; Decreased strength;Decreased endurance;Decreased balance; Increased Pain;Decreased safe awareness  Decision Making: Medium Complexity  History: high  Exam: high  Clinical Presentation: medium    Prognosis: Good    DISCHARGE RECOMMENDATIONS:  Discharge Recommendations: Continue to assess pending progress, Patient would benefit from continued therapy after discharge    Assessment: Pt demonstrates the above deficits and decline in functional mobility status placing them at increased risk for falls. Pt would benefit from physical therapy to address above deficits and allow for safe return home at highest level of function, decrease risk for falls, and improve QOL.     REQUIRES PT FOLLOW UP: Yes      PLAN OF CARE:  Plan  Times per week: 3-6  Current Treatment Recommendations: Strengthening, Functional Mobility Training, Neuromuscular Re-education, Home Exercise Program, Equipment Evaluation, Education, & procurement, Transfer Training, Gait Training, Safety Education & Training, Balance Training, Endurance Training, Stair training, Patient/Caregiver Education & Training, Positioning  Safety Devices  Type of devices: Bed alarm in place, Call light within reach    Goals:  Patient goals : agreeable to PT POC  Long term goals  Long term goal 1: indep with transfers  Long term goal 2: ambulate >100 ft rollator vs cane indep  Long term goal 3: pt to navigate 12 steps modified indep  Long term goal 4: indep with HEP    [facility] [address] (6 CLICK) [address] Mobility Raw Score : 20    Therapy Time:   Individual   Time In 1550   Time Out 1601   Minutes Dennis [address] [place] [address], 05/29/19 at 4:09 PM

## 2019-05-29 NOTE — PROGRESS NOTES
Patient  Admitted for a fall in the sidewalk due to dizziness. Alert and oriented x4. Neuro checks done, with fairly strong hand . Good foot pump. Pupils equal and reactive. . Lungs sound clear. Abdomen soft and ontender. Last BM today. Tele. NSR-80. Has a laceration behind the head with 6 sutures applied in ER. Has a headache at a level of 8/10. On flexeril 10mg po for low back pain spasms. Placed on CIWA protocol due to having  3-5 glasses of wine daily.

## 2019-05-30 LAB
ALBUMIN SERPL-MCNC: 2.1 G/DL (ref 3.5–4.6)
ALP BLD-CCNC: 478 U/L (ref 40–130)
ALT SERPL-CCNC: 94 U/L (ref 0–33)
ANION GAP SERPL CALCULATED.3IONS-SCNC: 13 MEQ/L (ref 9–15)
AST SERPL-CCNC: 117 U/L (ref 0–35)
BILIRUB SERPL-MCNC: 6.2 MG/DL (ref 0.2–0.7)
BILIRUBIN DIRECT: 5.6 MG/DL (ref 0–0.4)
BILIRUBIN, INDIRECT: 0.6 MG/DL (ref 0–0.6)
BUN BLDV-MCNC: 8 MG/DL (ref 8–23)
CALCIUM SERPL-MCNC: 7.9 MG/DL (ref 8.5–9.9)
CHLORIDE BLD-SCNC: 92 MEQ/L (ref 95–107)
CO2: 22 MEQ/L (ref 20–31)
CREAT SERPL-MCNC: 0.56 MG/DL (ref 0.5–0.9)
FOLATE: 7.3 NG/ML (ref 7.3–26.1)
GFR AFRICAN AMERICAN: >60
GFR NON-AFRICAN AMERICAN: >60
GLUCOSE BLD-MCNC: 103 MG/DL (ref 70–99)
HCT VFR BLD CALC: 34 % (ref 37–47)
HEMOGLOBIN: 11.3 G/DL (ref 12–16)
INR BLD: 1.3
IRON SATURATION: 106 % (ref 11–46)
IRON: 82 UG/DL (ref 37–145)
MAGNESIUM: 1.6 MG/DL (ref 1.7–2.4)
MCH RBC QN AUTO: 37.4 PG (ref 27–31.3)
MCHC RBC AUTO-ENTMCNC: 33.2 % (ref 33–37)
MCV RBC AUTO: 112.6 FL (ref 82–100)
PDW BLD-RTO: 20.6 % (ref 11.5–14.5)
PLATELET # BLD: 244 K/UL (ref 130–400)
POTASSIUM REFLEX MAGNESIUM: 3.8 MEQ/L (ref 3.4–4.9)
PROTHROMBIN TIME: 12.7 SEC (ref 9–11.5)
RBC # BLD: 3.02 M/UL (ref 4.2–5.4)
SODIUM BLD-SCNC: 127 MEQ/L (ref 135–144)
TOTAL IRON BINDING CAPACITY: 77 UG/DL (ref 178–450)
TOTAL PROTEIN: 4.9 G/DL (ref 6.3–8)
VITAMIN B-12: >2000 PG/ML (ref 232–1245)
WBC # BLD: 5.4 K/UL (ref 4.8–10.8)

## 2019-05-30 PROCEDURE — 82746 ASSAY OF FOLIC ACID SERUM: CPT

## 2019-05-30 PROCEDURE — 97116 GAIT TRAINING THERAPY: CPT

## 2019-05-30 PROCEDURE — 82607 VITAMIN B-12: CPT

## 2019-05-30 PROCEDURE — 99231 SBSQ HOSP IP/OBS SF/LOW 25: CPT | Performed by: INTERNAL MEDICINE

## 2019-05-30 PROCEDURE — 85027 COMPLETE CBC AUTOMATED: CPT

## 2019-05-30 PROCEDURE — 80076 HEPATIC FUNCTION PANEL: CPT

## 2019-05-30 PROCEDURE — 1210000000 HC MED SURG R&B

## 2019-05-30 PROCEDURE — 6370000000 HC RX 637 (ALT 250 FOR IP): Performed by: INTERNAL MEDICINE

## 2019-05-30 PROCEDURE — 2580000003 HC RX 258: Performed by: INTERNAL MEDICINE

## 2019-05-30 PROCEDURE — 36415 COLL VENOUS BLD VENIPUNCTURE: CPT

## 2019-05-30 PROCEDURE — 80048 BASIC METABOLIC PNL TOTAL CA: CPT

## 2019-05-30 PROCEDURE — 85610 PROTHROMBIN TIME: CPT

## 2019-05-30 PROCEDURE — 83735 ASSAY OF MAGNESIUM: CPT

## 2019-05-30 PROCEDURE — 83550 IRON BINDING TEST: CPT

## 2019-05-30 PROCEDURE — 2580000003 HC RX 258: Performed by: STUDENT IN AN ORGANIZED HEALTH CARE EDUCATION/TRAINING PROGRAM

## 2019-05-30 PROCEDURE — 83540 ASSAY OF IRON: CPT

## 2019-05-30 RX ORDER — LANOLIN ALCOHOL/MO/W.PET/CERES
100 CREAM (GRAM) TOPICAL DAILY
Qty: 30 TABLET | Refills: 3 | DISCHARGE
Start: 2019-05-31

## 2019-05-30 RX ORDER — MULTIVITAMIN WITH FOLIC ACID 400 MCG
1 TABLET ORAL DAILY
Refills: 0 | Status: ON HOLD | DISCHARGE
Start: 2019-05-31 | End: 2019-07-23

## 2019-05-30 RX ORDER — SODIUM CHLORIDE 9 MG/ML
INJECTION, SOLUTION INTRAVENOUS CONTINUOUS
Status: DISPENSED | OUTPATIENT
Start: 2019-05-30 | End: 2019-05-30

## 2019-05-30 RX ADMIN — ACETAMINOPHEN 650 MG: 325 TABLET ORAL at 22:32

## 2019-05-30 RX ADMIN — Medication 100 MG: at 10:26

## 2019-05-30 RX ADMIN — PANTOPRAZOLE SODIUM 40 MG: 40 TABLET, DELAYED RELEASE ORAL at 16:50

## 2019-05-30 RX ADMIN — THERA TABS 1 TABLET: TAB at 10:26

## 2019-05-30 RX ADMIN — METOPROLOL TARTRATE 50 MG: 50 TABLET ORAL at 22:33

## 2019-05-30 RX ADMIN — CYCLOBENZAPRINE HYDROCHLORIDE 10 MG: 10 TABLET, FILM COATED ORAL at 04:42

## 2019-05-30 RX ADMIN — Medication 10 ML: at 10:29

## 2019-05-30 RX ADMIN — FOLIC ACID 1 MG: 1 TABLET ORAL at 10:26

## 2019-05-30 RX ADMIN — Medication 10 ML: at 22:34

## 2019-05-30 RX ADMIN — PANTOPRAZOLE SODIUM 40 MG: 40 TABLET, DELAYED RELEASE ORAL at 06:13

## 2019-05-30 RX ADMIN — ACETAMINOPHEN 650 MG: 325 TABLET ORAL at 16:50

## 2019-05-30 RX ADMIN — ACETAMINOPHEN 650 MG: 325 TABLET ORAL at 04:13

## 2019-05-30 RX ADMIN — SODIUM CHLORIDE: 9 INJECTION, SOLUTION INTRAVENOUS at 13:54

## 2019-05-30 ASSESSMENT — PAIN DESCRIPTION - LOCATION
LOCATION: HEAD
LOCATION: BACK

## 2019-05-30 ASSESSMENT — PAIN SCALES - GENERAL
PAINLEVEL_OUTOF10: 2
PAINLEVEL_OUTOF10: 4
PAINLEVEL_OUTOF10: 0
PAINLEVEL_OUTOF10: 3
PAINLEVEL_OUTOF10: 3
PAINLEVEL_OUTOF10: 4

## 2019-05-30 NOTE — PROGRESS NOTES
Dr. Danisha Gaytan saw patient. Follow up with him in office. Ok to discharge patient tomorrow.   Electronically signed by Estrada Liriano RN on 5/30/2019 at 6:48 PM

## 2019-05-30 NOTE — PLAN OF CARE
Nutrition Problem: Inadequate oral intake  Intervention: Food and/or Nutrient Delivery: Continue current diet, Start ONS(high calorie supplement tid & frozen supplement bid)  Nutritional Goals: Intake >75% of meals/supplement. Stable weight.

## 2019-05-30 NOTE — PROGRESS NOTES
Physical Therapy Med Surg Daily Treatment Note  Facility/Department: [facility]  [facility]       NAME: Jhonathan Bello  : 1955 (15 y.o.)  MRN: [id number]  CODE STATUS: Full Code    Date of Service: 2019    Patient Diagnosis(es): Hyponatremia [E87.1]  Hyponatremia [E87.1]   Chief Complaint   Patient presents with    Head Injury     fall      Patient Active Problem List    Diagnosis Date Noted    Hyponatremia 2019    Alcohol abuse 2019    LFTs abnormal 2019    Essential hypertension 2019    GERD (gastroesophageal reflux disease) 2019        Past Medical History:   Diagnosis Date    Asthma     Atrial fibrillation ([address]     DVT (deep venous thrombosis) (HCC)     GERD (gastroesophageal reflux disease)     Hypertension     Status post tubal ligation     Thyroid disease     Hypothyroid     Past Surgical History:   Procedure Laterality Date    ATRIAL ABLATION SURGERY      BACK SURGERY      HYSTEROSCOPY      VARICOSE VEIN SURGERY            Restrictions:  Restrictions/Precautions: Fall Risk, Seizure    SUBJECTIVE:  General  Chart Reviewed: Yes  Family / Caregiver Present: Yes(2 members)  Subjective  Subjective: I had difficulty getting back from the bathroom. General Comment  Comments: pt fearful and anxious    Pre Pain Assessment:  Pre Treatment Pain Screening  Pain at present: 4  Scale Used: Numeric Score  Intervention List: Patient able to continue with treatment  Pain Screening  Patient Currently in Pain: Yes       Post Pain Assessment:   Pain Assessment  Pain Assessment: 0-10  Pain Level: 4  Pain Location: Back       OBJECTIVE:         Bed mobility  Supine to Sit: Supervision    Transfers  Sit to Stand: Stand by assistance  Stand to sit: Stand by assistance  Comment: pt steady.      Ambulation  Ambulation?: Yes  Ambulation 1  Surface: level tile  Device: Rollator  Assistance: Stand by assistance  Quality of Gait: flexed posture, quick pace anxious  Distance: 30' x3   Comments: pt taking seated rest breaks on rollator after short distances complainaing of back pain and dizziness, no noted LOB or instability, pt anxious and fearful. Stairs/Curb  Stairs?: No(pt adamantly declines to perform. )                                ASSESSMENT:  Body structures, Functions, Activity limitations: Decreased functional mobility ; Decreased strength;Decreased endurance;Decreased balance; Increased Pain;Decreased safe awareness    Assessment: pt did not need any assistance with mobility but became anxious and fearful several times throughout tx. Activity Tolerance  Activity Tolerance: Patient Tolerated treatment well       Discharge Recommendations:  Continue to assess pending progress, Patient would benefit from continued therapy after discharge    Goals:  Long term goals  Long term goal 1: indep with transfers  Long term goal 2: ambulate >100 ft rollator vs cane indep  Long term goal 3: pt to navigate 12 steps modified indep  Long term goal 4: indep with HEP  Patient Goals   Patient goals : agreeable to PT POC    PLAN:   Plan  Times per week: 3-6  Current Treatment Recommendations: Strengthening, Functional Mobility Training, Neuromuscular Re-education, Home Exercise Program, Equipment Evaluation, Education, & procurement, Transfer Training, Gait Training, Safety Education & Training, Balance Training, Endurance Training, Stair training, Patient/Caregiver Education & Training, Positioning  Safety Devices  Type of devices:  All fall risk precautions in place, Call light within reach, Chair alarm in place, Left in chair     [facility] (6 CLICK) Sheng 95 Raw Score : 20      Therapy Time   Individual   Time In 1320   Time Out 1335   Minutes 15      Gait: 12  BM/Trsf: 3        Camelia Leary PTA, 05/30/19 at 1:44 PM

## 2019-05-30 NOTE — CARE COORDINATION
RACHELLEW has spent several hours with Pt, bro and sister on DC planning. Pt has gone back and forth from [facility] and [facility]. RACHELLEW had Sana from [facility] meet with Pt and family to talk about price for rehab. At this time Pt has agreed to go to [facility] and sister will go to [facility] by 430 to pay for the SNF. Spring Hui Electronically signed by EMMANUEL Phan on 5/30/2019 at 1:53 PM

## 2019-05-30 NOTE — DISCHARGE SUMMARY
Hospital Medicine Discharge Summary    Zane Bermudez  :  1955  MRN:  [id number]    Admit date:  2019  Discharge date:  2019    Admitting Physician:  Caitlyn Broussard MD  Primary Care Physician:  Roopa Sargent MD      Discharge Diagnoses:    Principal Problem:    Hyponatremia  Active Problems:    Alcohol abuse    LFTs abnormal    Essential hypertension    GERD (gastroesophageal reflux disease)  Resolved Problems:    * No resolved hospital problems. *    Chief Complaint   Patient presents with    Head Injury     fall      Hospital Course: Zane Bermudez is a 59 y.o. female that was admitted and treated at [facility] for the following medical issues:     Principal Problem:    Hyponatremia  Active Problems:    Alcohol abuse    LFTs abnormal    Essential hypertension    GERD (gastroesophageal reflux disease)  Resolved Problems:    * No resolved hospital problems. *  Malnutrition (mild    67-year-old female with a past history of alcohol abuse who presented to the emergency room with a fall from a wheeled walker and hit in the back of her head. She was admitted for hyponatremia to [facility]. She also had elevated LFTs. Renal and GI was consulted. She was started on IV hydration and her sodium improved to 127 with hydration. She was extensively educated regarding alcohol abuse and its effect on lowering her sodium level. Right upper quadrant ultrasound was done that showed distended gallbladder with thickened wall with changes suggestive of metabolic derangement rather than acute cholecystitis. Her exam did not suggest acute cholecystitis. She did not have a fever. She was educated on proper hydration and encouraged to maintain her PO intake well. He is to follow up with GI in 1-2 weeks. Of note, due to physical deconditioning PT was consulted and patient was recommended to go to a skilled nursing facility so she was discharged to [facility].   Pt was discharge in a stable condition. Exam on discharge:   /70   Pulse 77   Temp 98.1 °F (36.7 °C) (Oral)   Resp 16   Ht 5' 4\" (1.626 m)   Wt 165 lb (74.8 kg)   SpO2 99%   BMI 28.32 kg/m²   General appearance: No apparent distress, appears stated age and cooperative. HEENT: Pupils equal, round, and reactive to light. Conjunctivae/corneas clear. Neck: Supple, with full range of motion. No jugular venous distention. Trachea midline. Respiratory:  Normal respiratory effort. Clear to auscultation, bilaterally without Rales/Wheezes/Rhonchi. Cardiovascular: Regular rate and rhythm with normal S1/S2 without murmurs, rubs or gallops. Abdomen: Soft, non-tender, non-distended with normal bowel sounds. Musculoskeletal: No clubbing, cyanosis or edema bilaterally. Full range of motion without deformity. Skin: Skin color, texture, turgor normal.  No rashes or lesions. Neuro: Non Focal. Symetrical motor and tone. Nl Comprehension, Alert,awake and oriented. NL CN. Symetrical tone and reflexes. Psychiatric: Alert and oriented, thought content appropriate, normal insight  Capillary Refill: Brisk,< 3 seconds   Peripheral Pulses: +2 palpable, equal bilaterally     Patient was seen by the following consultants while admitted to [facility]:   Consults:  Teresita Young  IP CONSULT TO CHEMICAL DEPENDENCY  IP CONSULT TO DIETITIAN    Significant Diagnostic Studies:    Ct Head Wo Contrast    Result Date: 5/28/2019  CT HEAD WO CONTRAST CLINICAL HISTORY: Fall hit back of COMPARISON: None TECHNIQUE: Multiple unenhanced serial axial images of the brain from the vertex of the skull to the base of the skull were performed. FINDINGS: The ventricles are dilated. This is compensatory to the surrounding moderate generalized parenchymal volume loss. No mass. No midline shift. The cisterns are patent.  There are white matter and periventricular changes most likely consistent with chronic small vessel disease. Old left subcortical frontal lacunar infarct. No acute intra-axial or extra-axial findings. The visualized osseous structures are unremarkable. The visualized portion of the paranasal sinuses, and mastoids are unremarkable. NO ACUTE INTRA-AXIAL OR EXTRA-AXIAL FINDINGS. All CT scans at this facility use dose modulation, iterative reconstruction, and/or weight based dosing when appropriate to reduce radiation dose to as low as reasonably achievable. Ct Cervical Spine Wo Contrast    Result Date: 5/28/2019  CT CERVICAL SPINE WO CONTRAST CLINICAL HISTORY:  fall COMPARISON: NONE Findings: Multiple serial axial images of the cervical spine from the base of the skull through the upper thoracic vertebra with both sagittal and coronal reconstructions was performed. There is straightening of the normal expected cervical lordosis. There is multilevel degenerative joint disease. Prevertebral  soft tissues are  unremarkable. The disk spaces are fell and hit back of head narrowed at the C5-C6 level. No acute fractures or spondylo-listhesis. .     NO ACUTE FRACTURES. All CT scans at this facility use dose modulation, iterative reconstruction, and/or weight based dosing when appropriate to reduce radiation dose to as low as reasonably achievable. Us Abdomen Limited    Result Date: 5/29/2019  US ABDOMEN LIMITED : 5/28/2019 CLINICAL HISTORY:  RUQ pain, elevated alkphos and bilirubin . COMPARISON: None available. TECHNIQUE: Transabdominal ultrasound of the right upper quadrant was performed. FINDINGS: The study is markedly limited by the patient's moderately enlarged heterogeneous echogenic liver, which may reflect fatty infiltration, cirrhosis and/or hepatitis. The gallbladder is moderately distended with moderate wall thickening, which is probably reactive secondary to metabolic derangement rather than a calculus cholecystitis.  There is no significant ascites, biliary dilatation, or abnormal masses identified within limits of the study. The right kidney is unremarkable. The pancreas is not visualized. EXTREMELY LIMITED STUDY. MODERATELY ENLARGED HETEROGENEOUS LIVER, AS DESCRIBED. GALLBLADDER DISTENTION, SLUDGE AND WALL THICKENING, WHICH IS PROBABLY REACTIVE RELATED TO METABOLIC DERANGEMENT RATHER THAN A CALCULUS CHOLECYSTITIS. CLINICAL CORRELATION WILL BE IMPORTANT. NO BILIARY DILATATION, ASCITES, OR OTHER FINDINGS OF CONCERN IDENTIFIED, WITHIN THE LIMITS OF THE STUDY. Discharge Medications:       [facility]   Home Medication Instructions RTU:[id number]    Printed on:05/30/19 1597   Medication Information                      aspirin 325 MG tablet  Take 325 mg by mouth daily             cyclobenzaprine (FLEXERIL) 5 MG tablet  Take 5 mg by mouth 3 times daily as needed for Muscle spasms             levothyroxine (SYNTHROID) 150 MCG tablet  Take 150 mcg by mouth Daily             meclizine (ANTIVERT) 12.5 MG tablet  Take 12.5 mg by mouth 3 times daily as needed             metoprolol tartrate (LOPRESSOR) 50 MG tablet  Take 50 mg by mouth daily             Multiple Vitamin (MULTIVITAMIN) tablet  Take 1 tablet by mouth daily             pantoprazole (PROTONIX) 40 MG tablet  Take 40 mg by mouth every morning (before breakfast)             vitamin B-1 100 MG tablet  Take 1 tablet by mouth daily                 Disposition:   If discharged to Home, Any [facility] needs that were indicated and/or required as been addressed and set up by Social Work. Condition at discharge: Pt was medically stable at the time of discharge. Activity: activity as tolerated    Total time taken for discharging this patient: 40 minutes. Greater than 70% of time was spent focused exclusively on this patient. Time was taken to review chart, discuss plans with consultants, reconciling medications, discussing plan answering questions with patient.      Shaheed Umana  5/30/2019, 1:50 PM  ----------------------------------------------------------------------------------------------------------------------    Jermaine Barriga,

## 2019-05-30 NOTE — DISCHARGE INSTR - COC
Continuity of Care Form    Patient Name: Maye Rujuan f   :  1955  MRN:  [id number]    Admit date:  2019  Discharge date:  ***    Code Status Order: Full Code   Advance Directives:   [facility] Documentation     Date/Time Healthcare Directive Type of Healthcare Directive Copy in [address] Agent's Name Healthcare Agent's Phone Number    19  Yes, patient has an advance directive for healthcare treatment  Living will;Durable power of  for health care  No, copy requested from family  Healthcare power of   Hansa Desouza  [phone]          Admitting Physician:  Janessa Jaime MD  PCP: Jaiden Calhoun MD    Discharging Nurse: [facility] Unit/Room#: P845/I608-17  Discharging Unit Phone Number: [phone]    Emergency Contact:   Extended Emergency Contact Information  Primary Emergency Contact: bianca [address] [place] [address] Phone: [phone]  Relation: Brother/Sister   needed? No    Past Surgical History:  Past Surgical History:   Procedure Laterality Date    ATRIAL ABLATION SURGERY      BACK SURGERY      HYSTEROSCOPY      VARICOSE VEIN SURGERY         Immunization History: There is no immunization history on file for this patient.     Active Problems:  Patient Active Problem List   Diagnosis Code    Hyponatremia E87.1    Alcohol abuse F10.10    LFTs abnormal R94.5    Essential hypertension I10    GERD (gastroesophageal reflux disease) K21.9       Isolation/Infection:   Isolation          No Isolation            Nurse Assessment:  Last Vital Signs: /70   Pulse 77   Temp 98.1 °F (36.7 °C) (Oral)   Resp 16   Ht 5' 4\" (1.626 m)   Wt 165 lb (74.8 kg)   SpO2 99%   BMI 28.32 kg/m²     Last documented pain score (0-10 scale): Pain Level: 4  Last Weight:   Wt Readings from Last 1 Encounters:   19 165 lb (74.8 kg)     Mental Status:  A&Ox3    Nursing Mobility/ADLs:  Walking   Assisted  Transfer Assisted  Bathing  Assisted  Dressing  Assisted  Toileting  Assisted  Feeding  Independent  Med Admin  Independent  Med Delivery   whole    Wound Care Documentation and Therapy:  Wound 05/28/19 Head Posterior Head Laceration from falling (Active)   Wound Traumatic 5/29/2019  9:02 PM   Dressing Status Old drainage; Intact 5/29/2019  9:02 PM   Dressing/Treatment Open to air 5/29/2019  9:02 PM   Wound Cleansed Not Cleansed 5/29/2019  8:00 AM   Drainage Amount Other (Comment) 5/29/2019  8:00 AM   Number of days: 1        Elimination:  Continence:   · Bowel: Yes  · Bladder: Yes         Date of Last BM: 5/31    Intake/Output Summary (Last 24 hours) at 5/30/2019 1350  Last data filed at 5/30/2019 0518  Gross per 24 hour   Intake 1560 ml   Output --   Net 1560 ml     I/O last 3 completed shifts: In: 1860 [P.O.:1860]  Out: -     Safety Concerns:     Risk for falls          Nutrition Therapy:  Current Nutrition Therapy:   General diet    Routes of Feeding: PO/thin liquids/no restrictions      Treatments at the Time of Hospital Discharge:   Oxygen Therapy:  RA      Rehab Therapies: PT/OT  Patient's personal belongings (please select all that are sent with patient):  Packed at time of discharge    RN SIGNATURE:  Electronically signed by Xin Davis RN on 5/31/19 at 11:35 AM        CASE MANAGEMENT/SOCIAL WORK SECTION    Inpatient Status Date: ***    Readmission Risk Assessment Score:  Readmission Risk              Risk of Unplanned Readmission:        10           Discharging to Facility/ Agency   · Name: [facility]  · Address:  · Phone:[phone]  · Fax:    Dialysis Facility (if applicable)   · Name:  · Address:  · Dialysis Schedule:  · Phone:  · Fax:    / signature: {Esignature:[id number]}. Electronically signed by EMMANUEL Tucker on 5/30/2019 at 1:54 PM    PHYSICIAN SECTION    Prognosis: Good    Condition at Discharge: Stable    Rehab Potential (if transferring to Rehab): Good    Recommended Labs or Other Treatments After Discharge: none     Physician Certification: I certify the above information and transfer of Ashley Kirk  is necessary for the continuing treatment of the diagnosis listed and that she requires [facility] for less 30 days.      Update Admission H&P: No change in H&P    PHYSICIAN SIGNATURE:  Electronically signed by Hugo Macias DO on 5/30/19 at 1:50 PM

## 2019-05-30 NOTE — PROGRESS NOTES
Gastroenterology Progress Note      Rito Tan   Hospitalization Day:2    Chief C/o: elevated liver function tests, alcoholic hepatitis     SUBJECTIVE: patient sitting up in bed. Family is at the bedside. Patient denies abdominal pain, N/V. She denies melena, hematochezia, or hematemesis. Patient reports she is planning on going to a nursing home for rehab. Patient with no complaints at this time. RN denies any withdraw symptoms, no need to medicate per [facility] protocol. ROS GI: As mentioned above    Physical    VITALS:  /70   Pulse 77   Temp 98.1 °F (36.7 °C) (Oral)   Resp 16   Ht 5' 4\" (1.626 m)   Wt 165 lb (74.8 kg)   SpO2 99%   BMI 28.32 kg/m²   TEMPERATURE:  Current - Temp: 98.1 °F (36.7 °C); Max - Temp  Av.1 °F (36.7 °C)  Min: 97.9 °F (36.6 °C)  Max: 98.2 °F (36.8 °C)    General- No apparent distress, alert and oriented   Eyes- scleral icterus, no pallor  Cardiovascular- RRR withtout murmur  Lungs- clear but diminished to auscultation bilaterally  Abdomen soft, nondistended, nontender, no organomegaly, Bowel sounds normal   Extremities- without edema  Skin- + jaundice    Data      Recent Labs     19  1537 19  0536   WBC 3.4* 3.1*   HGB 10.3* 9.5*   HCT 30.1* 27.6*   .5* 110.7*    175     Recent Labs     19  0536 19  1416 19  0558   * 124* 127*   K 3.9 4.7 3.8   CL 90* 90* 92*   CO2 21 23 22   BUN 8 8 8   CREATININE 0.44* 0.42* 0.56     Recent Labs     19  1515 19  0536 19  0558   * 114* 117*   * 106* 94*   BILIDIR 4.4* 5.5* 5.6*   BILITOT 6.2* 6.1* 6.2*   ALKPHOS 538* 498* 478*     No results for input(s): LIPASE, AMYLASE in the last 72 hours. Recent Labs     19  1515   PROTIME 11.8*   INR 1.2     ASSESSMENT :  59year old female patient admitted with alcoholic hepatitis.  Abdominal ultrasound reports- moderately enlarged heterogenous echogenic liver, which may reflect fatty infiltration, cirrhosis and or hepatitis. ALT 94/, Bilirubin 6.2, albumin 2.1, protein 4.9. Iron 82, TIBC 77. Vitamin B12 >2000, folate 7.3. Will repeat CBC today along with INR. PLAN:  - Daily LFT, INR  - Daily CBC  - CIWA protocol  - Daily PPI    Thank you for allowing me to participate in the care of your patient.   Feel free to contact me with any questions or concerns    Pearl Ferrara MD

## 2019-05-30 NOTE — PROGRESS NOTES
Nephrology Progress Note    Assessment:  59y.o. year old female with history s/f A.fib s/p ablation, GERD, HTN, hypothyroidism and EtOH use who presented to the ED after she fell from her wheelchair and hit her head causing a laceration at the back of her head and LOC.      1. Hyponatremia: likely 2/2 low solute intake in setting of EtOH use +/- NSAID use. H/o hypothyroidism. 118 on presentation. Increased w/ fluids. Yvonne < 20  2. Anion gap metabolic acidosis  3. Hypoalbuminemia  4. Transaminitis     Plan:  - will give NS today and stop thereafter  - no NSAIDs     Thank you for the consultation. Will continue to follow  Please do not hesitate to call with questions. Patient Active Problem List:     Hyponatremia     Alcohol abuse     LFTs abnormal     Essential hypertension     GERD (gastroesophageal reflux disease)      Subjective:  Admit Date: 5/28/2019    Interval History: Na better this AM, 127, PO intake increased, abd U/S showed moderately enlarged liver, gallbladder distension     Medications:  Scheduled Meds:   lidocaine-EPINEPHrine  20 mL Intradermal Once    metoprolol tartrate  50 mg Oral BID    pantoprazole  40 mg Oral BID AC    sodium chloride flush  10 mL Intravenous 2 times per day    sodium chloride flush  10 mL Intravenous 2 times per day    thiamine  100 mg Oral Daily    folic acid  1 mg Oral Daily    multivitamin  1 tablet Oral Daily     Continuous Infusions:    CBC:   Recent Labs     05/28/19  1537 05/29/19  0536   WBC 3.4* 3.1*   HGB 10.3* 9.5*    175     CMP:    Recent Labs     05/29/19  0536 05/29/19  1416 05/30/19  0558   * 124* 127*   K 3.9 4.7 3.8   CL 90* 90* 92*   CO2 21 23 22   BUN 8 8 8   CREATININE 0.44* 0.42* 0.56   GLUCOSE 78 103* 103*   CALCIUM 7.3* 7.6* 7.9*   LABGLOM >60.0 >60.0 >60.0     Troponin: No results for input(s): TROPONINI in the last 72 hours. BNP: No results for input(s): BNP in the last 72 hours.   INR:   Recent Labs     05/28/19  1515   INR 1.2     Lipids: No results for input(s): CHOL, LDLDIRECT, TRIG, HDL, AMYLASE, LIPASE in the last 72 hours. Liver:   Recent Labs     05/30/19  0558   *   ALT 94*   ALKPHOS 478*   PROT 4.9*   LABALBU 2.1*   BILITOT 6.2*     Iron:  No results for input(s): IRONS, FERRITIN in the last 72 hours. Invalid input(s): LABIRONS  Urinalysis: No results for input(s): UA in the last 72 hours.     Objective:  Vitals: /70   Pulse 77   Temp 98.1 °F (36.7 °C) (Oral)   Resp 16   Ht 5' 4\" (1.626 m)   Wt 165 lb (74.8 kg)   SpO2 99%   BMI 28.32 kg/m²    Wt Readings from Last 3 Encounters:   05/28/19 165 lb (74.8 kg)      24HR INTAKE/OUTPUT:      Intake/Output Summary (Last 24 hours) at 5/30/2019 1042  Last data filed at 5/30/2019 0518  Gross per 24 hour   Intake 1560 ml   Output --   Net 1560 ml       General: alert, in no apparent distress  HEENT: normocephalic, atraumatic, anicteric  Neck: supple, no mass  Lungs: non-labored respirations, clear to auscultation bilaterally  Heart: regular rate and rhythm, no murmurs or rubs  Abdomen: soft, non-tender, non-distended  Ext: no cyanosis, no peripheral edema  Neuro: alert and oriented, no gross abnormalities  Psych: normal mood and affect  Skin: no rash      Electronically signed by Hussain Rutherford MD

## 2019-05-31 VITALS
WEIGHT: 165 LBS | HEART RATE: 82 BPM | RESPIRATION RATE: 20 BRPM | SYSTOLIC BLOOD PRESSURE: 134 MMHG | TEMPERATURE: 97.5 F | OXYGEN SATURATION: 98 % | BODY MASS INDEX: 28.17 KG/M2 | HEIGHT: 64 IN | DIASTOLIC BLOOD PRESSURE: 79 MMHG

## 2019-05-31 LAB
ANION GAP SERPL CALCULATED.3IONS-SCNC: 11 MEQ/L (ref 9–15)
BUN BLDV-MCNC: 8 MG/DL (ref 8–23)
CALCIUM SERPL-MCNC: 7.6 MG/DL (ref 8.5–9.9)
CHLORIDE BLD-SCNC: 96 MEQ/L (ref 95–107)
CO2: 20 MEQ/L (ref 20–31)
CREAT SERPL-MCNC: 0.58 MG/DL (ref 0.5–0.9)
GFR AFRICAN AMERICAN: >60
GFR NON-AFRICAN AMERICAN: >60
GLUCOSE BLD-MCNC: 92 MG/DL (ref 70–99)
HCT VFR BLD CALC: 27.3 % (ref 37–47)
HEMOGLOBIN: 9.4 G/DL (ref 12–16)
INR BLD: 1.4
MAGNESIUM: 2.2 MG/DL (ref 1.7–2.4)
MCH RBC QN AUTO: 38.3 PG (ref 27–31.3)
MCHC RBC AUTO-ENTMCNC: 34.3 % (ref 33–37)
MCV RBC AUTO: 111.7 FL (ref 82–100)
PDW BLD-RTO: 20.1 % (ref 11.5–14.5)
PLATELET # BLD: 223 K/UL (ref 130–400)
POTASSIUM SERPL-SCNC: 3.8 MEQ/L (ref 3.4–4.9)
PROTHROMBIN TIME: 13.7 SEC (ref 9–11.5)
RBC # BLD: 2.45 M/UL (ref 4.2–5.4)
SODIUM BLD-SCNC: 127 MEQ/L (ref 135–144)
WBC # BLD: 4.3 K/UL (ref 4.8–10.8)

## 2019-05-31 PROCEDURE — 85610 PROTHROMBIN TIME: CPT

## 2019-05-31 PROCEDURE — 85027 COMPLETE CBC AUTOMATED: CPT

## 2019-05-31 PROCEDURE — 6370000000 HC RX 637 (ALT 250 FOR IP): Performed by: INTERNAL MEDICINE

## 2019-05-31 PROCEDURE — 80048 BASIC METABOLIC PNL TOTAL CA: CPT

## 2019-05-31 PROCEDURE — 83735 ASSAY OF MAGNESIUM: CPT

## 2019-05-31 PROCEDURE — 6360000002 HC RX W HCPCS: Performed by: INTERNAL MEDICINE

## 2019-05-31 PROCEDURE — 97116 GAIT TRAINING THERAPY: CPT

## 2019-05-31 PROCEDURE — 2580000003 HC RX 258: Performed by: INTERNAL MEDICINE

## 2019-05-31 PROCEDURE — 36415 COLL VENOUS BLD VENIPUNCTURE: CPT

## 2019-05-31 RX ADMIN — PANTOPRAZOLE SODIUM 40 MG: 40 TABLET, DELAYED RELEASE ORAL at 06:26

## 2019-05-31 RX ADMIN — ACETAMINOPHEN 650 MG: 325 TABLET ORAL at 06:26

## 2019-05-31 RX ADMIN — MAGNESIUM SULFATE HEPTAHYDRATE 1 G: 1 INJECTION, SOLUTION INTRAVENOUS at 00:09

## 2019-05-31 RX ADMIN — FOLIC ACID 1 MG: 1 TABLET ORAL at 10:31

## 2019-05-31 RX ADMIN — METOPROLOL TARTRATE 50 MG: 50 TABLET ORAL at 10:31

## 2019-05-31 RX ADMIN — ACETAMINOPHEN 650 MG: 325 TABLET ORAL at 10:31

## 2019-05-31 RX ADMIN — Medication 100 MG: at 10:31

## 2019-05-31 RX ADMIN — MAGNESIUM SULFATE HEPTAHYDRATE 1 G: 1 INJECTION, SOLUTION INTRAVENOUS at 01:12

## 2019-05-31 RX ADMIN — Medication 10 ML: at 10:32

## 2019-05-31 RX ADMIN — THERA TABS 1 TABLET: TAB at 10:31

## 2019-05-31 ASSESSMENT — PAIN SCALES - GENERAL
PAINLEVEL_OUTOF10: 3
PAINLEVEL_OUTOF10: 0
PAINLEVEL_OUTOF10: 4

## 2019-05-31 NOTE — PROGRESS NOTES
Nephrology Progress Note    Assessment:  59y.o. year old female with history s/f A.fib s/p ablation, GERD, HTN, hypothyroidism and EtOH use who presented to the ED after she fell from her wheelchair and hit her head causing a laceration at the back of her head and LOC.      1. Hyponatremia: likely 2/2 low solute intake in setting of EtOH use +/- NSAID use. H/o hypothyroidism. 118 on presentation. Increased w/ fluids. Yvonne < 20  2. Anion gap metabolic acidosis  3. Hypoalbuminemia  4. Transaminitis     Plan:  - rechecking urine indices today  - monitor Na today, no change to current management  - increase PO for additional solute     Thank you for the consultation. Will continue to follow  Please do not hesitate to call with questions. Patient Active Problem List:     Hyponatremia     Alcohol abuse     LFTs abnormal     Essential hypertension     GERD (gastroesophageal reflux disease)      Subjective:  Admit Date: 5/28/2019    Interval History: Na stable, had BRBPR yesterday, guaic also positive, also c/o HA    Medications:  Scheduled Meds:   lidocaine-EPINEPHrine  20 mL Intradermal Once    metoprolol tartrate  50 mg Oral BID    pantoprazole  40 mg Oral BID AC    sodium chloride flush  10 mL Intravenous 2 times per day    sodium chloride flush  10 mL Intravenous 2 times per day    thiamine  100 mg Oral Daily    folic acid  1 mg Oral Daily    multivitamin  1 tablet Oral Daily     Continuous Infusions:    CBC:   Recent Labs     05/30/19  1410 05/31/19  0613   WBC 5.4 4.3*   HGB 11.3* 9.4*    223     CMP:    Recent Labs     05/29/19  1416 05/30/19  0558 05/31/19  0613   * 127* 127*   K 4.7 3.8 3.8   CL 90* 92* 96   CO2 23 22 20   BUN 8 8 8   CREATININE 0.42* 0.56 0.58   GLUCOSE 103* 103* 92   CALCIUM 7.6* 7.9* 7.6*   LABGLOM >60.0 >60.0 >60.0     Troponin: No results for input(s): TROPONINI in the last 72 hours. BNP: No results for input(s): BNP in the last 72 hours.   INR:   Recent Labs

## 2019-05-31 NOTE — FLOWSHEET NOTE
0447- Shift assessment completed at this time, Pt A&Ox4, denies chest pain/ complains of SOB with exertion, denies nausea/vomiting, states having some dizziness when up walking, head laceration to back of head with dry bloody, drainage and 6 suture, pt appears jaundice, pt up to toilet this AM with night shift nurse and had bright red blood in toilet, message sent to Dr. Jayme Peterson concerning stool and no new orders received just notified this nurse to notify primary [address] he arrives-KGW  0904- Perfectserv message sent to Dr. Yamileth Snow to notify him of blood in stool, this nurse did guiac point of care stool and it was positive and notified Dr. Yamileth Snow of results as well as pt complaining of dizziness and SOB with exertion, notified physician of hgb change to 9.4 no new orders received just to notify Dr. Luis Enrique Rayo of [address]- Perfectserv message sent to Dr. Luis Enrique Rayo concerning pt having blood in stool, no new orders received-KGW  1055- Report called to Gio Maciel 2 [facility]  Electronically signed by Jl Cruz, RN on 5/31/2019

## 2019-05-31 NOTE — PROGRESS NOTES
Physician Progress Note    2019   12:34 PM    Name:  Pedro Briones  MRN:    [id number]     IP Day: 3     Admit Date: 2019  3:08 PM  PCP: Merline Madden MD    Code Status:  Full Code    Subjective:      no new events. Feels better this afternoon.      Physical Examination:      Vitals:  /79   Pulse 82   Temp 97.5 °F (36.4 °C) (Oral)   Resp 20   Ht 5' 4\" (1.626 m)   Wt 165 lb (74.8 kg)   SpO2 98%   BMI 28.32 kg/m²   Temp (24hrs), Av.1 °F (36.7 °C), Min:97.5 °F (36.4 °C), Max:98.6 °F (37 °C)      General appearance: alert, cooperative and no distress  Mental Status: oriented to person, place and time and normal affect  Lungs: clear to auscultation bilaterally, normal effort  Heart: regular rate and rhythm, no murmur  Abdomen: soft, nontender, nondistended, bowel sounds present, no masses  Extremities: no edema, redness, tenderness in the calves  Skin: no gross lesions, rashes    Data:     Labs:  Recent Labs     19  1410 19  0613   WBC 5.4 4.3*   HGB 11.3* 9.4*    223     Recent Labs     19  0558 19  0613   * 127*   K 3.8 3.8   CL 92* 96   CO2 22 20   BUN 8 8   CREATININE 0.56 0.58   GLUCOSE 103* 92     Recent Labs     19  0536 19  0558   * 117*   * 94*   BILITOT 6.1* 6.2*   ALKPHOS 498* 478*       Current Facility-Administered Medications   Medication Dose Route Frequency Provider Last Rate Last Dose    acetaminophen (TYLENOL) tablet 650 mg  650 mg Oral Q4H PRN Roland  Yogesh DO   650 mg at 19 1031    lidocaine-EPINEPHrine 1 percent-1:448258 injection 20 mL  20 mL Intradermal Once Juaquin Vaughn PA-C        metoprolol tartrate (LOPRESSOR) tablet 50 mg  50 mg Oral BID Anish Guerrero MD   50 mg at 05/31/19 1031    pantoprazole (PROTONIX) tablet 40 mg  40 mg Oral BID AC Anish Guerrero MD   40 mg at 19 0640    cyclobenzaprine (FLEXERIL) tablet 10 mg  10 mg Oral TID PRN Ni Tejeda MD   10 mg at 19 2142    sodium chloride flush 0.9 % injection 10 mL  10 mL Intravenous 2 times per day Anish Lugo MD   10 mL at 05/29/19 0004    sodium chloride flush 0.9 % injection 10 mL  10 mL Intravenous PRN Anish Lugo MD        magnesium hydroxide (MILK OF MAGNESIA) 400 MG/5ML suspension 30 mL  30 mL Oral Daily PRN Anish Lugo MD        ondansetron (ZOFRAN) injection 4 mg  4 mg Intravenous Q6H PRN Anish Lugo MD        potassium chloride (KLOR-CON M) extended release tablet 40 mEq  40 mEq Oral PRN Anish Guerrero MD        Or   [facility] potassium chloride (KLOR-CON) packet 40 mEq  40 mEq Oral PRN Anish Guerrero MD        Or    potassium chloride 10 mEq/100 mL IVPB (Peripheral Line)  10 mEq Intravenous PRN Anish Lugo MD        magnesium sulfate 1 g in dextrose 5% 100 mL IVPB  1 g Intravenous PRN Anish Lugo MD   Stopped at 05/31/19 0215    sodium chloride flush 0.9 % injection 10 mL  10 mL Intravenous 2 times per day Anish Lugo MD   10 mL at 05/31/19 1032    sodium chloride flush 0.9 % injection 10 mL  10 mL Intravenous PRN Anish Lugo MD        vitamin B-1 (THIAMINE) tablet 100 mg  100 mg Oral Daily Anish Lugo MD   100 mg at 27/89/76 3292    folic acid (FOLVITE) tablet 1 mg  1 mg Oral Daily Anish Lugo MD   1 mg at 05/31/19 1031    LORazepam (ATIVAN) tablet 1 mg  1 mg Oral Q1H PRN Anish Guerrero MD        Or    LORazepam (ATIVAN) injection 1 mg  1 mg Intravenous Q1H PRN Anish Guerrero MD        Or    LORazepam (ATIVAN) tablet 2 mg  2 mg Oral Q1H PRN Anish Guerrero MD        Or    LORazepam (ATIVAN) injection 2 mg  2 mg Intravenous Q1H PRN Anish Guerrero MD        Or    LORazepam (ATIVAN) tablet 3 mg  3 mg Oral Q1H PRN Anish Guerrero MD        Or    LORazepam (ATIVAN) injection 3 mg  3 mg Intravenous Q1H PRN Anish Guerrero MD        Or    LORazepam (ATIVAN) tablet 4 mg  4 mg Oral Q1H PRN Anish Guerrero MD        Or    LORazepam (ATIVAN) injection 4 mg  4 mg Intravenous Q1H PRN Osiel Powell MD       [facility] multivitamin 1 tablet  1 tablet Oral Daily Anish Guerrero MD   1 tablet at 05/31/19 1031     Assessment and Plan: Active problems:       - Hyponatremia: improving. due to alcoholism/ beer potomania, resume fluid management as per Nephrology consult      - Elevated LFTs: secondary to alcohol use. RUQ US noted, has sludge in GB . GI consult. Protonix BID. No signs or symptoms of active bleeding. Patient is afebrile and HDS    - concern for blood in the stool- patient has stable vitals, asymptomatic at this time. Discussed with Dr George Griffiths. Will follow up as outpatient. Pt was educated on signs and symptoms of active GI bleeding, she understood the instructions. May need an outpt colonoscopy.      - Alcoholic liver disease: Child Suero Class B, MELD score 24.  GI consulted     - Alcohol abuse: CIWA protocol     - Fall: neuro checks every 4 hours , due to concern for LOC after fall              DISCHARGE PLANNING  Dc today        Electronically signed by Landy Barone DO on 5/31/2019 at 12:34 PM

## 2019-05-31 NOTE — PROGRESS NOTES
Physical Therapy Med Surg Daily Treatment Note  Facility/Department: [address] NEURO  Room: [place] [id number]       NAME: Marques Farrell  : 1955 (20 y.o.)  MRN: [id number]  CODE STATUS: Full Code    Date of Service: 2019    Patient Diagnosis(es): Hyponatremia [E87.1]  Hyponatremia [E87.1]   Chief Complaint   Patient presents with    Head Injury     fall      Patient Active Problem List    Diagnosis Date Noted    Hyponatremia 2019    Alcohol abuse 2019    LFTs abnormal 2019    Essential hypertension 2019    GERD (gastroesophageal reflux disease) 2019        Past Medical History:   Diagnosis Date    Asthma     Atrial fibrillation ([address]     DVT (deep venous thrombosis) (HCC)     GERD (gastroesophageal reflux disease)     Hypertension     Status post tubal ligation     Thyroid disease     Hypothyroid     Past Surgical History:   Procedure Laterality Date    ATRIAL ABLATION SURGERY      BACK SURGERY      HYSTEROSCOPY      VARICOSE VEIN SURGERY            Restrictions:  Restrictions/Precautions: Fall Risk, Seizure    SUBJECTIVE:  General  Chart Reviewed: Yes  Family / Caregiver Present: No  Subjective  Subjective: My back has been okay so far.    General Comment  Comments: pt fearful and anxious    Pre Pain Assessment:  Pre Treatment Pain Screening  Pain at present: 0  Scale Used: Numeric Score  Intervention List: Patient able to continue with treatment  Pain Screening  Patient Currently in Pain: No       Post Pain Assessment:   Pain Assessment  Pain Assessment: 0-10  Pain Level: 0       OBJECTIVE:         Bed mobility  Supine to Sit: Independent    Transfers  Sit to Stand: Supervision  Stand to sit: Supervision  Bed to Chair: Supervision    Ambulation  Ambulation?: Yes  Ambulation 1  Surface: level tile  Device: Rollator  Assistance: Supervision  Quality of Gait: flexed posture, quick pace anxious  Distance: 30' x2 48'   Comments: pt taking seated rest breaks on

## 2019-05-31 NOTE — CARE COORDINATION
Plan is [facility] when stable. DEJAN,7000 and sister paid for 1mo stay at [facility] for therapy. Samoan Justice Samoan Justice Electronically signed by EMMANUEL Armijo on 5/31/2019 at 7:42 AM

## 2019-05-31 NOTE — FLOWSHEET NOTE
Perfect-serve message sent to Dr Rory Amezcua to notify that patient used RR and bright red blood noted in toilet hat. Awaiting reply back for order to send stool.

## 2019-06-03 NOTE — PROGRESS NOTES
Physical Therapy  [facility] O343/[id number]  Physical Therapy Discharge      NAME: Ashley Kirk    : 1955 (59 y.o.)  MRN: [id number]    Account: [de-identified]  Gender: female      Patient has been discharged from acute care hospital. DC patient from current PT program.      Electronically signed by Mathew Bryan PT on 6/3/19 at 4:50 PM

## 2019-06-24 ENCOUNTER — OFFICE VISIT (OUTPATIENT)
Dept: GASTROENTEROLOGY | Age: 64
End: 2019-06-24

## 2019-06-24 VITALS — HEART RATE: 76 BPM | RESPIRATION RATE: 16 BRPM | BODY MASS INDEX: 25.84 KG/M2 | HEIGHT: 67 IN | OXYGEN SATURATION: 95 %

## 2019-06-24 DIAGNOSIS — K21.9 GASTROESOPHAGEAL REFLUX DISEASE, ESOPHAGITIS PRESENCE NOT SPECIFIED: Primary | ICD-10-CM

## 2019-06-24 DIAGNOSIS — F10.10 ALCOHOL ABUSE: ICD-10-CM

## 2019-06-24 DIAGNOSIS — R79.89 ELEVATED LIVER FUNCTION TESTS: ICD-10-CM

## 2019-06-24 DIAGNOSIS — R13.10 DYSPHAGIA, UNSPECIFIED TYPE: ICD-10-CM

## 2019-06-24 DIAGNOSIS — K76.9 LIVER DISEASE: ICD-10-CM

## 2019-06-24 PROCEDURE — 99213 OFFICE O/P EST LOW 20 MIN: CPT | Performed by: NURSE PRACTITIONER

## 2019-06-24 RX ORDER — ALBUTEROL SULFATE 90 UG/1
2 AEROSOL, METERED RESPIRATORY (INHALATION)
COMMUNITY
Start: 2019-02-04

## 2019-06-24 RX ORDER — LORATADINE 10 MG/1
10 TABLET ORAL
COMMUNITY

## 2019-06-24 NOTE — PROGRESS NOTES
no tenderness. Abdominal: Soft. Bowel sounds are normal. She exhibits no distension and no mass. There is no tenderness. There is no rebound and no guarding. Central obesity    Musculoskeletal: Normal range of motion. She exhibits edema (Bilateral LE 1+). Lymphadenopathy:     She has no cervical adenopathy. Neurological: She is alert and oriented to person, place, and time. Skin: Skin is warm and dry. No rash noted. She is not diaphoretic. No erythema. No pallor. Psychiatric: She has a normal mood and affect. Her behavior is normal. Judgment and thought content normal.   Vitals reviewed. Laboratory, Pathology, Radiology reviewed in detail with relevantimportant investigations summarized below:    Recent Labs     05/31/19  0613 05/30/19  1410 05/29/19  0536   WBC 4.3* 5.4 3.1*   HGB 9.4* 11.3* 9.5*   HCT 27.3* 34.0* 27.6*   .7* 112.6* 110.7*    244 175     Lab Results   Component Value Date    ALT 94 (H) 05/30/2019     (H) 05/30/2019    ALKPHOS 478 (H) 05/30/2019    BILITOT 6.2 (H) 05/30/2019     Us Abdomen Limited  Result Date: 5/29/2019  EXTREMELY LIMITED STUDY. MODERATELY ENLARGED HETEROGENEOUS LIVER, AS DESCRIBED. GALLBLADDER DISTENTION, SLUDGE AND WALL THICKENING, WHICH IS PROBABLY REACTIVE RELATED TO METABOLIC DERANGEMENT RATHER THAN A CALCULUS CHOLECYSTITIS. CLINICAL CORRELATION WILL BE IMPORTANT. NO BILIARY DILATATION, ASCITES, OR OTHER FINDINGS OF CONCERN IDENTIFIED, WITHIN THE LIMITS OF THE STUDY. Endoscopic Evaluations: Colonoscopy 2012- Diverticulosis in the sigmoid colon. Surveillance colonoscopy due in 2022    Assessment and Plan:  Severo Osier 62 y.o. female for follow up after recent hospitalization for acute alcohol induced hepatitis. Patient finishing up rehab at [facility]. Continued alcohol cessation encouraged. Will recheck liver function test, CBC, and INR. Antireflux lifestyle encouraged, multiple examples provided.   Daily PPI 15 to 30 minutes

## 2019-07-22 ENCOUNTER — HOSPITAL ENCOUNTER (OUTPATIENT)
Age: 64
Setting detail: OBSERVATION
Discharge: HOME | End: 2019-07-23
Attending: INTERNAL MEDICINE | Admitting: INTERNAL MEDICINE
Payer: MEDICAID

## 2019-07-22 ENCOUNTER — APPOINTMENT (OUTPATIENT)
Dept: CT IMAGING | Age: 64
End: 2019-07-22
Payer: MEDICAID

## 2019-07-22 DIAGNOSIS — N39.0 URINARY TRACT INFECTION WITHOUT HEMATURIA, SITE UNSPECIFIED: ICD-10-CM

## 2019-07-22 DIAGNOSIS — E87.20 LACTIC ACIDOSIS: Primary | ICD-10-CM

## 2019-07-22 DIAGNOSIS — R60.9 PERIPHERAL EDEMA: ICD-10-CM

## 2019-07-22 DIAGNOSIS — F10.920 ACUTE ALCOHOLIC INTOXICATION WITHOUT COMPLICATION (HCC): ICD-10-CM
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
## 2019-07-22 LAB
ALBUMIN SERPL-MCNC: 2.5 G/DL (ref 3.5–4.6)
ALP BLD-CCNC: 308 U/L (ref 40–130)
ALT SERPL-CCNC: 70 U/L (ref 0–33)
AMPHETAMINE SCREEN, URINE: NORMAL
ANION GAP SERPL CALCULATED.3IONS-SCNC: 17 MEQ/L (ref 9–15)
AST SERPL-CCNC: 84 U/L (ref 0–35)
BACTERIA: ABNORMAL /HPF
BARBITURATE SCREEN URINE: NORMAL
BASOPHILS ABSOLUTE: 0.1 K/UL (ref 0–0.2)
BASOPHILS RELATIVE PERCENT: 1.1 %
BENZODIAZEPINE SCREEN, URINE: NORMAL
BILIRUB SERPL-MCNC: 2.3 MG/DL (ref 0.2–0.7)
BILIRUBIN URINE: NEGATIVE
BLOOD, URINE: NEGATIVE
BUN BLDV-MCNC: 3 MG/DL (ref 8–23)
CALCIUM SERPL-MCNC: 7.9 MG/DL (ref 8.5–9.9)
CANNABINOID SCREEN URINE: NORMAL
CHLORIDE BLD-SCNC: 101 MEQ/L (ref 95–107)
CLARITY: ABNORMAL
CO2: 22 MEQ/L (ref 20–31)
COCAINE METABOLITE SCREEN URINE: NORMAL
COLOR: YELLOW
CREAT SERPL-MCNC: 0.36 MG/DL (ref 0.5–0.9)
EKG ATRIAL RATE: 81 BPM
EKG P AXIS: 54 DEGREES
EKG P-R INTERVAL: 134 MS
EKG Q-T INTERVAL: 432 MS
EKG QRS DURATION: 86 MS
EKG QTC CALCULATION (BAZETT): 501 MS
EKG R AXIS: 69 DEGREES
EKG T AXIS: 39 DEGREES
EKG VENTRICULAR RATE: 81 BPM
EOSINOPHILS ABSOLUTE: 0.7 K/UL (ref 0–0.7)
EOSINOPHILS RELATIVE PERCENT: 14.9 %
EPITHELIAL CELLS, UA: ABNORMAL /HPF (ref 0–5)
ETHANOL PERCENT: 0.15 G/DL
ETHANOL: 170 MG/DL (ref 0–0.08)
GFR AFRICAN AMERICAN: >60
GFR NON-AFRICAN AMERICAN: >60
GLOBULIN: 3.8 G/DL (ref 2.3–3.5)
GLUCOSE BLD-MCNC: 93 MG/DL (ref 70–99)
GLUCOSE URINE: NEGATIVE MG/DL
HCT VFR BLD CALC: 34.5 % (ref 37–47)
HEMOGLOBIN: 11.8 G/DL (ref 12–16)
HYALINE CASTS: ABNORMAL /HPF (ref 0–5)
KETONES, URINE: NEGATIVE MG/DL
LACTIC ACID: 4 MMOL/L (ref 0.5–2.2)
LEUKOCYTE ESTERASE, URINE: ABNORMAL
LYMPHOCYTES ABSOLUTE: 1.2 K/UL (ref 1–4.8)
LYMPHOCYTES RELATIVE PERCENT: 24.1 %
Lab: NORMAL
MAGNESIUM: 1.8 MG/DL (ref 1.7–2.4)
MCH RBC QN AUTO: 35.1 PG (ref 27–31.3)
MCHC RBC AUTO-ENTMCNC: 34.1 % (ref 33–37)
MCV RBC AUTO: 103 FL (ref 82–100)
MONOCYTES ABSOLUTE: 0.5 K/UL (ref 0.2–0.8)
MONOCYTES RELATIVE PERCENT: 10.5 %
NEUTROPHILS ABSOLUTE: 2.5 K/UL (ref 1.4–6.5)
NEUTROPHILS RELATIVE PERCENT: 49.4 %
NITRITE, URINE: POSITIVE
OPIATE SCREEN URINE: NORMAL
PDW BLD-RTO: 14.7 % (ref 11.5–14.5)
PH UA: 5 (ref 5–9)
PHENCYCLIDINE SCREEN URINE: NORMAL
PLATELET # BLD: 227 K/UL (ref 130–400)
POTASSIUM SERPL-SCNC: 3.6 MEQ/L (ref 3.4–4.9)
PROTEIN UA: NEGATIVE MG/DL
RBC # BLD: 3.35 M/UL (ref 4.2–5.4)
RBC UA: ABNORMAL /HPF (ref 0–5)
SODIUM BLD-SCNC: 140 MEQ/L (ref 135–144)
SPECIFIC GRAVITY UA: 1.01 (ref 1–1.03)
TOTAL PROTEIN: 6.3 G/DL (ref 6.3–8)
TROPONIN: <0.01 NG/ML (ref 0–0.01)
URINE REFLEX TO CULTURE: YES
UROBILINOGEN, URINE: 0.2 E.U./DL
WBC # BLD: 5 K/UL (ref 4.8–10.8)
WBC UA: ABNORMAL /HPF (ref 0–5)

## 2019-07-22 PROCEDURE — 85025 COMPLETE CBC W/AUTO DIFF WBC: CPT

## 2019-07-22 PROCEDURE — 87077 CULTURE AEROBIC IDENTIFY: CPT

## 2019-07-22 PROCEDURE — 36415 COLL VENOUS BLD VENIPUNCTURE: CPT

## 2019-07-22 PROCEDURE — 99285 EMERGENCY DEPT VISIT HI MDM: CPT

## 2019-07-22 PROCEDURE — 81001 URINALYSIS AUTO W/SCOPE: CPT

## 2019-07-22 PROCEDURE — 80307 DRUG TEST PRSMV CHEM ANLYZR: CPT

## 2019-07-22 PROCEDURE — G0480 DRUG TEST DEF 1-7 CLASSES: HCPCS

## 2019-07-22 PROCEDURE — 84484 ASSAY OF TROPONIN QUANT: CPT

## 2019-07-22 PROCEDURE — 80053 COMPREHEN METABOLIC PANEL: CPT

## 2019-07-22 PROCEDURE — 96374 THER/PROPH/DIAG INJ IV PUSH: CPT

## 2019-07-22 PROCEDURE — 83735 ASSAY OF MAGNESIUM: CPT

## 2019-07-22 PROCEDURE — 70450 CT HEAD/BRAIN W/O DYE: CPT

## 2019-07-22 PROCEDURE — 72125 CT NECK SPINE W/O DYE: CPT

## 2019-07-22 PROCEDURE — 93005 ELECTROCARDIOGRAM TRACING: CPT | Performed by: PHYSICIAN ASSISTANT

## 2019-07-22 PROCEDURE — 83880 ASSAY OF NATRIURETIC PEPTIDE: CPT

## 2019-07-22 PROCEDURE — 87186 SC STD MICRODIL/AGAR DIL: CPT

## 2019-07-22 PROCEDURE — 83605 ASSAY OF LACTIC ACID: CPT

## 2019-07-22 PROCEDURE — 87086 URINE CULTURE/COLONY COUNT: CPT

## 2019-07-22 RX ORDER — SODIUM CHLORIDE 0.9 % (FLUSH) 0.9 %
3 SYRINGE (ML) INJECTION EVERY 8 HOURS
Status: DISCONTINUED | OUTPATIENT
Start: 2019-07-22 | End: 2019-07-23 | Stop reason: HOSPADM

## 2019-07-22 RX ORDER — 0.9 % SODIUM CHLORIDE 0.9 %
1000 INTRAVENOUS SOLUTION INTRAVENOUS ONCE
Status: COMPLETED | OUTPATIENT
Start: 2019-07-22 | End: 2019-07-23

## 2019-07-22 RX ORDER — ACETAMINOPHEN 500 MG
1000 TABLET ORAL ONCE
Status: COMPLETED | OUTPATIENT
Start: 2019-07-22 | End: 2019-07-23

## 2019-07-22 ASSESSMENT — ENCOUNTER SYMPTOMS
EYE DISCHARGE: 0
SHORTNESS OF BREATH: 0
APNEA: 0
VOMITING: 0
VOICE CHANGE: 0
ANAL BLEEDING: 0
ABDOMINAL DISTENTION: 0
COUGH: 0
ABDOMINAL PAIN: 0
PHOTOPHOBIA: 0

## 2019-07-23 ENCOUNTER — APPOINTMENT (OUTPATIENT)
Dept: GENERAL RADIOLOGY | Age: 64
End: 2019-07-23
Payer: MEDICAID

## 2019-07-23 ENCOUNTER — APPOINTMENT (OUTPATIENT)
Dept: ULTRASOUND IMAGING | Age: 64
End: 2019-07-23
Payer: MEDICAID

## 2019-07-23 VITALS
TEMPERATURE: 98.1 F | OXYGEN SATURATION: 100 % | SYSTOLIC BLOOD PRESSURE: 132 MMHG | DIASTOLIC BLOOD PRESSURE: 71 MMHG | RESPIRATION RATE: 18 BRPM | HEART RATE: 79 BPM | BODY MASS INDEX: 29.59 KG/M2 | WEIGHT: 184.1 LBS | HEIGHT: 66 IN

## 2019-07-23 PROBLEM — I10 ESSENTIAL HYPERTENSION: Chronic | Status: ACTIVE | Noted: 2019-05-28

## 2019-07-23 PROBLEM — E87.20 LACTIC ACIDOSIS: Status: ACTIVE | Noted: 2019-07-23

## 2019-07-23 PROBLEM — R29.6 FALLS FREQUENTLY: Status: ACTIVE | Noted: 2019-07-23

## 2019-07-23 PROBLEM — R79.89 LFTS ABNORMAL: Chronic | Status: ACTIVE | Noted: 2019-05-28

## 2019-07-23 LAB
FOLATE: 8.5 NG/ML (ref 7.3–26.1)
LACTIC ACID: 4.4 MMOL/L (ref 0.5–2.2)
LV EF: 70 %
LVEF MODALITY: NORMAL
PRO-BNP: 782 PG/ML
VITAMIN B-12: 1490 PG/ML (ref 232–1245)

## 2019-07-23 PROCEDURE — 36415 COLL VENOUS BLD VENIPUNCTURE: CPT

## 2019-07-23 PROCEDURE — 6370000000 HC RX 637 (ALT 250 FOR IP): Performed by: HOSPITALIST

## 2019-07-23 PROCEDURE — G0378 HOSPITAL OBSERVATION PER HR: HCPCS

## 2019-07-23 PROCEDURE — 6370000000 HC RX 637 (ALT 250 FOR IP): Performed by: PHYSICIAN ASSISTANT

## 2019-07-23 PROCEDURE — 93306 TTE W/DOPPLER COMPLETE: CPT

## 2019-07-23 PROCEDURE — 93970 EXTREMITY STUDY: CPT

## 2019-07-23 PROCEDURE — 71045 X-RAY EXAM CHEST 1 VIEW: CPT

## 2019-07-23 PROCEDURE — 2580000003 HC RX 258: Performed by: HOSPITALIST

## 2019-07-23 PROCEDURE — 6360000002 HC RX W HCPCS: Performed by: HOSPITALIST

## 2019-07-23 PROCEDURE — 96372 THER/PROPH/DIAG INJ SC/IM: CPT

## 2019-07-23 PROCEDURE — 82607 VITAMIN B-12: CPT

## 2019-07-23 PROCEDURE — 2580000003 HC RX 258: Performed by: PHYSICIAN ASSISTANT

## 2019-07-23 PROCEDURE — 6360000002 HC RX W HCPCS: Performed by: PHYSICIAN ASSISTANT

## 2019-07-23 PROCEDURE — 2580000003 HC RX 258: Performed by: INTERNAL MEDICINE

## 2019-07-23 PROCEDURE — 96361 HYDRATE IV INFUSION ADD-ON: CPT

## 2019-07-23 PROCEDURE — 82746 ASSAY OF FOLIC ACID SERUM: CPT

## 2019-07-23 PROCEDURE — 83605 ASSAY OF LACTIC ACID: CPT

## 2019-07-23 RX ORDER — ASPIRIN 325 MG
325 TABLET ORAL DAILY
Status: DISCONTINUED | OUTPATIENT
Start: 2019-07-23 | End: 2019-07-23 | Stop reason: HOSPADM

## 2019-07-23 RX ORDER — ONDANSETRON 2 MG/ML
4 INJECTION INTRAMUSCULAR; INTRAVENOUS EVERY 6 HOURS PRN
Status: DISCONTINUED | OUTPATIENT
Start: 2019-07-23 | End: 2019-07-23 | Stop reason: HOSPADM

## 2019-07-23 RX ORDER — LEVOTHYROXINE SODIUM 0.07 MG/1
150 TABLET ORAL DAILY
Status: DISCONTINUED | OUTPATIENT
Start: 2019-07-23 | End: 2019-07-23 | Stop reason: HOSPADM

## 2019-07-23 RX ORDER — SODIUM CHLORIDE 0.9 % (FLUSH) 0.9 %
10 SYRINGE (ML) INJECTION EVERY 12 HOURS SCHEDULED
Status: DISCONTINUED | OUTPATIENT
Start: 2019-07-23 | End: 2019-07-23 | Stop reason: HOSPADM

## 2019-07-23 RX ORDER — LORAZEPAM 2 MG/ML
1 INJECTION INTRAMUSCULAR
Status: DISCONTINUED | OUTPATIENT
Start: 2019-07-23 | End: 2019-07-23 | Stop reason: HOSPADM

## 2019-07-23 RX ORDER — FOLIC ACID 1 MG/1
1 TABLET ORAL DAILY
Status: DISCONTINUED | OUTPATIENT
Start: 2019-07-23 | End: 2019-07-23 | Stop reason: HOSPADM

## 2019-07-23 RX ORDER — LORAZEPAM 2 MG/ML
4 INJECTION INTRAMUSCULAR
Status: DISCONTINUED | OUTPATIENT
Start: 2019-07-23 | End: 2019-07-23 | Stop reason: HOSPADM

## 2019-07-23 RX ORDER — LORAZEPAM 1 MG/1
2 TABLET ORAL
Status: DISCONTINUED | OUTPATIENT
Start: 2019-07-23 | End: 2019-07-23 | Stop reason: HOSPADM

## 2019-07-23 RX ORDER — LORAZEPAM 2 MG/ML
3 INJECTION INTRAMUSCULAR
Status: DISCONTINUED | OUTPATIENT
Start: 2019-07-23 | End: 2019-07-23 | Stop reason: HOSPADM

## 2019-07-23 RX ORDER — MECLIZINE HCL 12.5 MG/1
12.5 TABLET ORAL 3 TIMES DAILY PRN
Status: DISCONTINUED | OUTPATIENT
Start: 2019-07-23 | End: 2019-07-23 | Stop reason: HOSPADM

## 2019-07-23 RX ORDER — LORAZEPAM 2 MG/ML
2 INJECTION INTRAMUSCULAR
Status: DISCONTINUED | OUTPATIENT
Start: 2019-07-23 | End: 2019-07-23 | Stop reason: HOSPADM

## 2019-07-23 RX ORDER — ACETAMINOPHEN 325 MG/1
650 TABLET ORAL EVERY 4 HOURS PRN
Status: DISCONTINUED | OUTPATIENT
Start: 2019-07-23 | End: 2019-07-23 | Stop reason: HOSPADM

## 2019-07-23 RX ORDER — FAMOTIDINE 20 MG/1
20 TABLET, FILM COATED ORAL 2 TIMES DAILY
Status: DISCONTINUED | OUTPATIENT
Start: 2019-07-23 | End: 2019-07-23 | Stop reason: HOSPADM

## 2019-07-23 RX ORDER — LORAZEPAM 1 MG/1
1 TABLET ORAL
Status: DISCONTINUED | OUTPATIENT
Start: 2019-07-23 | End: 2019-07-23 | Stop reason: HOSPADM

## 2019-07-23 RX ORDER — SODIUM CHLORIDE 9 MG/ML
INJECTION, SOLUTION INTRAVENOUS CONTINUOUS
Status: DISCONTINUED | OUTPATIENT
Start: 2019-07-23 | End: 2019-07-23

## 2019-07-23 RX ORDER — SODIUM CHLORIDE 0.9 % (FLUSH) 0.9 %
10 SYRINGE (ML) INJECTION PRN
Status: DISCONTINUED | OUTPATIENT
Start: 2019-07-23 | End: 2019-07-23 | Stop reason: HOSPADM

## 2019-07-23 RX ORDER — METOPROLOL TARTRATE 50 MG/1
50 TABLET, FILM COATED ORAL DAILY
Status: DISCONTINUED | OUTPATIENT
Start: 2019-07-23 | End: 2019-07-23 | Stop reason: HOSPADM

## 2019-07-23 RX ORDER — THIAMINE MONONITRATE (VIT B1) 100 MG
100 TABLET ORAL DAILY
Status: DISCONTINUED | OUTPATIENT
Start: 2019-07-23 | End: 2019-07-23 | Stop reason: HOSPADM

## 2019-07-23 RX ORDER — LORAZEPAM 1 MG/1
4 TABLET ORAL
Status: DISCONTINUED | OUTPATIENT
Start: 2019-07-23 | End: 2019-07-23 | Stop reason: HOSPADM

## 2019-07-23 RX ORDER — LORAZEPAM 1 MG/1
3 TABLET ORAL
Status: DISCONTINUED | OUTPATIENT
Start: 2019-07-23 | End: 2019-07-23 | Stop reason: HOSPADM

## 2019-07-23 RX ADMIN — METOPROLOL TARTRATE 50 MG: 50 TABLET ORAL at 08:11

## 2019-07-23 RX ADMIN — Medication 100 MG: at 08:10

## 2019-07-23 RX ADMIN — ACETAMINOPHEN 650 MG: 325 TABLET ORAL at 02:33

## 2019-07-23 RX ADMIN — MECLIZINE 12.5 MG: 12.5 TABLET ORAL at 08:10

## 2019-07-23 RX ADMIN — ACETAMINOPHEN 650 MG: 325 TABLET ORAL at 11:59

## 2019-07-23 RX ADMIN — SODIUM CHLORIDE 1000 ML: 9 INJECTION, SOLUTION INTRAVENOUS at 00:23

## 2019-07-23 RX ADMIN — SODIUM CHLORIDE: 9 INJECTION, SOLUTION INTRAVENOUS at 02:41

## 2019-07-23 RX ADMIN — LEVOTHYROXINE SODIUM 150 MCG: 75 TABLET ORAL at 06:29

## 2019-07-23 RX ADMIN — ENOXAPARIN SODIUM 40 MG: 40 INJECTION SUBCUTANEOUS at 08:12

## 2019-07-23 RX ADMIN — CEFTRIAXONE SODIUM 1 G: 1 INJECTION, POWDER, FOR SOLUTION INTRAMUSCULAR; INTRAVENOUS at 00:23

## 2019-07-23 RX ADMIN — FAMOTIDINE 20 MG: 20 TABLET ORAL at 08:10

## 2019-07-23 RX ADMIN — ASPIRIN 325 MG: 325 TABLET, COATED ORAL at 08:10

## 2019-07-23 RX ADMIN — ACETAMINOPHEN 650 MG: 325 TABLET ORAL at 06:33

## 2019-07-23 RX ADMIN — FAMOTIDINE 20 MG: 20 TABLET ORAL at 02:41

## 2019-07-23 RX ADMIN — SODIUM CHLORIDE: 9 INJECTION, SOLUTION INTRAVENOUS at 05:57

## 2019-07-23 RX ADMIN — FOLIC ACID 1 MG: 1 TABLET ORAL at 08:11

## 2019-07-23 RX ADMIN — ACETAMINOPHEN 1000 MG: 500 TABLET ORAL at 00:23

## 2019-07-23 ASSESSMENT — PAIN - FUNCTIONAL ASSESSMENT
PAIN_FUNCTIONAL_ASSESSMENT: PREVENTS OR INTERFERES SOME ACTIVE ACTIVITIES AND ADLS
PAIN_FUNCTIONAL_ASSESSMENT: PREVENTS OR INTERFERES WITH MANY ACTIVE NOT PASSIVE ACTIVITIES

## 2019-07-23 ASSESSMENT — PAIN SCALES - GENERAL
PAINLEVEL_OUTOF10: 5
PAINLEVEL_OUTOF10: 5
PAINLEVEL_OUTOF10: 2
PAINLEVEL_OUTOF10: 6
PAINLEVEL_OUTOF10: 5
PAINLEVEL_OUTOF10: 6
PAINLEVEL_OUTOF10: 6
PAINLEVEL_OUTOF10: 5

## 2019-07-23 ASSESSMENT — PAIN DESCRIPTION - ORIENTATION
ORIENTATION_2: LEFT;RIGHT;LOWER
ORIENTATION: RIGHT;LEFT

## 2019-07-23 ASSESSMENT — PAIN DESCRIPTION - ONSET
ONSET: ON-GOING
ONSET: ON-GOING

## 2019-07-23 ASSESSMENT — PAIN DESCRIPTION - DESCRIPTORS
DESCRIPTORS: HEADACHE;THROBBING
DESCRIPTORS: ACHING

## 2019-07-23 ASSESSMENT — PAIN DESCRIPTION - PROGRESSION
CLINICAL_PROGRESSION: NOT CHANGED
CLINICAL_PROGRESSION: NOT CHANGED

## 2019-07-23 ASSESSMENT — PAIN DESCRIPTION - LOCATION
LOCATION: HEAD
LOCATION_2: LEG
LOCATION: LEG
LOCATION: HEAD

## 2019-07-23 ASSESSMENT — PAIN DESCRIPTION - FREQUENCY
FREQUENCY: INTERMITTENT
FREQUENCY: INTERMITTENT

## 2019-07-23 ASSESSMENT — PAIN DESCRIPTION - PAIN TYPE
TYPE_2: ACUTE PAIN
TYPE: ACUTE PAIN

## 2019-07-23 ASSESSMENT — PAIN DESCRIPTION - INTENSITY: RATING_2: 6

## 2019-07-23 NOTE — ED PROVIDER NOTES
SOCIAL HISTORY       Social History     Socioeconomic History    Marital status:      Spouse name: None    Number of children: None    Years of education: None    Highest education level: None   Occupational History    None   Social Needs    Financial resource strain: None    Food insecurity:     Worry: None     Inability: None    Transportation needs:     Medical: None     Non-medical: None   Tobacco Use    Smoking status: Never Smoker    Smokeless tobacco: Never Used   Substance and Sexual Activity    Alcohol use: Yes     Comment: Drinks daily sometime 3 sometimes 5    Drug use: Not Currently    Sexual activity: Not Currently   Lifestyle    Physical activity:     Days per week: None     Minutes per session: None    Stress: None   Relationships    Social connections:     Talks on phone: None     Gets together: None     Attends Religion service: None     Active member of club or organization: None     Attends meetings of clubs or organizations: None     Relationship status: None    Intimate partner violence:     Fear of current or ex partner: None     Emotionally abused: None     Physically abused: None     Forced sexual activity: None   Other Topics Concern    None   Social History Narrative    None       SCREENINGS   NIH Stroke Scale  Interval: Baseline  Level of Consciousness (1a. ): Alert  LOC Questions (1b. ):  Answers both correctly  LOC Commands (1c. ): Obeys both correctly  Visual (3. ): No visual loss  Facial Palsy (4. ): Normal  Motor Arm, Left (5a. ): No drift  Motor Arm, Right (5b. ): No drift  Motor Leg, Left (6a. ): No drift  Motor Leg, Right (6b. ): No drift  Limb Ataxia (7. ): Absent  Sensory (8. ): Normal  Best Language (9. ): No aphasia  Dysarthria (10. ): Normal  Extinction and Inattention (11): No neglect  @FLOW([id number])@      PHYSICAL EXAM    (up to 7 for level 4, 8 or more for level 5)     ED Triage Vitals   BP Temp Temp src Pulse Resp SpO2 Height Weight   -- --

## 2019-07-23 NOTE — ED NOTES
luann  at bedside obtaining labs.   Patient resting at this time with no acute distress     Belem Aldridge RN  07/22/19 5801

## 2019-07-23 NOTE — FLOWSHEET NOTE
Patient arrived to room 186 via cart,she was able to walk from the doorway to the bathroom,she voided with difficulty,she denies pain after or during urination. 0145:patient  Seated on the chair at sink side and she is in the process of getting wash for she smells like urine. assisted patient with her bath,warm blanket was provided per her level of comfort. 02:33:patient was medicated with 650 mg. Of tylenol for her occipital pain. no nausea or dizziness. meanwhile,she is eating apple sauce,she declined the offer of a turkey wrapped. 03:35: is in the room checking on the patient. the ivf rate was decreased to 100 ml./hr. Per his instruction. 06:30:she complain of feet cramps so she was instructed to move her toes and per patient it does so little to relieve her cramping so tylenol was given,she rated both her foot and occipital pain 5/10 whereby zero as no pain and ten being the worst pain there is.she is in bed at the moment ,watching television.

## 2019-07-23 NOTE — H&P
[facility]    HISTORY AND PHYSICAL EXAM    PATIENT NAME:  Estevan Ann    MRN:  [id number]  SERVICE DATE:  7/23/2019   SERVICE TIME:  2:03 AM    Primary Care Physician: Racheal Garcia MD         SUBJECTIVE  CHIEF COMPLAINT:  Falls    HPI:  This is a 62 y.o. female who presents with significant PMH A. fib status post ablation, DVT, alcoholism, weakness and dizziness and falls associated with her alcoholism, HTN, hypothyroidism; patient presented to the ER earlier tonight with complaints of multiple falls. Patient is an alcoholic although she is not honest about her disease. Patient states she did not have any alcohol today and her last drink of wine was last night however patient does have a positive alcohol level about twice the legal limit. Patient has chronic weakness and dizziness associated with her alcoholism which results in her falls. Patient was admitted here back in the end of May for complications associated with her alcoholism and shortly after she was sent to rehab for 1 month for her weakness and dizziness. Patient states she only has a glass of wine a day and only on 5 out of 7 days. The patient's sister tells a different story. The sister states she drinks 2 bottles of wine per day as a minimum and she drinks every day. Patient has chronically elevated LFTs suggestive of cirrhosis. Patient does not follow-up with her PCP. Patient also states she has bilateral lower extremity swelling that started within last few months that is tender to touch which she states also makes it difficult to walk. Patient usually ambulates with some type of device and also uses a Rollator. Patient was admitted in May because she fell out of her Rollator and hit the back of her head and lost consciousness which was secondary to her alcohol abuse.   Patient denies fevers, chest pain, palpitations, shortness of breath, abdominal pain, nausea, vomiting, diarrhea, dysuria, urgency or frequency, sexual activity: Not on file   Other Topics Concern    Not on file   Social History Narrative    Not on file     MEDICATIONS:   Prior to Admission medications    Medication Sig Start Date End Date Taking? Authorizing Provider   albuterol sulfate HFA (PROAIR HFA) 108 (90 Base) MCG/ACT inhaler Inhale 2 puffs into the lungs 2/4/19  Yes Historical Provider, MD   loratadine (CLARITIN) 10 MG tablet Take 10 mg by mouth   Yes Historical Provider, MD   vitamin B-1 100 MG tablet Take 1 tablet by mouth daily 5/31/19  Yes Lucy Kumar DO   aspirin 325 MG tablet Take 325 mg by mouth daily   Yes Historical Provider, MD   levothyroxine (SYNTHROID) 150 MCG tablet Take 150 mcg by mouth Daily   Yes Historical Provider, MD   meclizine (ANTIVERT) 12.5 MG tablet Take 12.5 mg by mouth 3 times daily as needed   Yes Historical Provider, MD   metoprolol tartrate (LOPRESSOR) 50 MG tablet Take 50 mg by mouth daily   Yes Historical Provider, MD   pantoprazole (PROTONIX) 40 MG tablet Take 40 mg by mouth every morning (before breakfast)   Yes Historical Provider, MD   cyclobenzaprine (FLEXERIL) 5 MG tablet Take 5 mg by mouth 3 times daily as needed for Muscle spasms   Yes Historical Provider, MD       ALLERGIES: Patient has no active allergies. REVIEW OF SYSTEM:   ROS as noted in HPI, 12 point ROS reviewed and otherwise negative.     OBJECTIVE  PHYSICAL EXAM: /70   Pulse 71   Temp 98.5 °F (36.9 °C) (Oral)   Resp 20   Ht 5' 4\" (1.626 m)   Wt 165 lb (74.8 kg)   SpO2 100%   BMI 28.32 kg/m²     CONSTITUTIONAL:  awake, alert, cooperative, appears older than stated age and moderately obese, smell of alcohol on breath  LUNGS:  No increased work of breathing, good air exchange, clear to auscultation bilaterally, no crackles or wheezing  CARDIOVASCULAR:  normal apical pulses, regular rate and rhythm, normal S1 and S2, no edema and +2 edema BLE, murmur  ABDOMEN:  normal bowel sounds, soft, non-distended and non-tender, on labs likely type 2 acidosis due to liver failure from alcohol use. Will monitor and hydrate.        Deep Kc, [address] [facility]

## 2019-07-23 NOTE — DISCHARGE SUMMARY
Hospital Medicine Discharge Summary    Evelio Wetzel  :  1955  MRN:  [id number]    Admit date:  2019  Discharge date:  2019    Admitting Physician: Jessa Garcia MD  Primary Care Physician:  Daniel Mary MD      Discharge Diagnoses:    Principal Problem:    Lactic acidosis  Active Problems:    Alcohol abuse    LFTs abnormal    Essential hypertension    Falls frequently  Resolved Problems:    * No resolved hospital problems. *    Chief Complaint   Patient presents with    Dizziness     dizzy and has been falling frequently     Hospital Course: Evelio Wetzel is a 62 y.o. female that was admitted and treated at [facility] for the following medical issues:     Principal Problem:    Lactic acidosis  Active Problems:    Alcohol abuse    LFTs abnormal    Essential hypertension    Falls frequently  Resolved Problems:    * No resolved hospital problems. *      Patient presented the ED with dizziness. Patient is also having falls and she has a history of alcohol abuse. She was noted to have lactic acidosis and her labs. She was treated with IV hydration and antiemetics. Her p.o. intake is not great and she has been drinking alcohol excessively daily. She improved by the next day and was discharged home. She has bilateral lower extremity edema. She had an echo done and heart failure was ruled out. Doppler ultrasound did not show DVT in the lower extremity. He was instructed to use compression stocking and keep legs elevated when sitting. Pt was discharge in a stable condition. Exam on discharge:   /71   Pulse 79   Temp 98.1 °F (36.7 °C) (Oral)   Resp 18   Ht 5' 6\" (1.676 m)   Wt 184 lb 1.6 oz (83.5 kg)   LMP  (LMP Unknown)   SpO2 100%   Breastfeeding? No   BMI 29.71 kg/m²   General appearance: No apparent distress, appears stated age and cooperative. HEENT: Pupils equal, round, and reactive to light. Conjunctivae/corneas clear.   Neck: Supple, with full range of motion. No jugular venous distention. Trachea midline. Respiratory:  Normal respiratory effort. Clear to auscultation, bilaterally without Rales/Wheezes/Rhonchi. Cardiovascular: Regular rate and rhythm with normal S1/S2 without murmurs, rubs or gallops. Abdomen: Soft, non-tender, non-distended with normal bowel sounds. Musculoskeletal: No clubbing, cyanosis or edema bilaterally. Full range of motion without deformity. Skin: Skin color, texture, turgor normal.  No rashes or lesions. Neuro: Non Focal. Symetrical motor and tone. Nl Comprehension, Alert,awake and oriented. NL CN. Symetrical tone and reflexes. Psychiatric: Alert and oriented, thought content appropriate, normal insight  Capillary Refill: Brisk,< 3 seconds   Peripheral Pulses: +2 palpable, equal bilaterally     Patient was seen by the following consultants while admitted to [facility]:   Consults:  IP CONSULT TO CASE MANAGEMENT    Significant Diagnostic Studies:    Ct Head Wo Contrast    Result Date: 7/23/2019  EXAMINATION:  CT HEAD WO CONTRAST CLINICAL HISTORY:   fall with head injury . Chief complaint multiple falls, striking posterior aspect of head, eval for bleed/ fx COMPARISONS:  May 28, 2019 TECHNIQUE:  Spiral axial images of brain were obtained without contrast enhancement. Multiplanar two-dimensional reformatting was completed at CT console. FINDINGS:  There is moderate generalized age-related atrophy. There is atrophy related ventriculomegaly. There is widespread age-related white matter hypodensity. There is a 6 mm focus of encephalomalacia in the distribution of the left anterior cerebral  artery, high in the left frontal lobe. There is no cerebral edema. There is no intracranial hemorrhage/hematoma. The skull is intact. There is no pneumocephalus. There is no orbital emphysema. There is no fluid in paranasal sinuses.  Temporal bones are grossly normal.     AGE-RELATED FINDINGS, UNCHANGED SINCE MAY 28,

## 2019-07-24 PROCEDURE — 93010 ELECTROCARDIOGRAM REPORT: CPT | Performed by: INTERNAL MEDICINE

## 2019-07-25 LAB
ORGANISM: ABNORMAL
URINE CULTURE, ROUTINE: ABNORMAL
URINE CULTURE, ROUTINE: ABNORMAL

## 2019-08-08 ENCOUNTER — HOSPITAL ENCOUNTER (INPATIENT)
Age: 64
LOS: 1 days | Discharge: HOME | DRG: 280 | End: 2019-08-10
Attending: EMERGENCY MEDICINE | Admitting: INTERNAL MEDICINE
Payer: MEDICAID

## 2019-08-08 ENCOUNTER — APPOINTMENT (OUTPATIENT)
Dept: GENERAL RADIOLOGY | Age: 64
DRG: 280 | End: 2019-08-08
Payer: MEDICAID

## 2019-08-08 DIAGNOSIS — E87.20 LACTIC ACIDOSIS: Primary | ICD-10-CM

## 2019-08-08 DIAGNOSIS — N30.00 ACUTE CYSTITIS WITHOUT HEMATURIA: ICD-10-CM

## 2019-08-08 DIAGNOSIS — J18.9 PNEUMONIA DUE TO ORGANISM: ICD-10-CM

## 2019-08-08 PROBLEM — R53.1 GENERAL WEAKNESS: Status: ACTIVE | Noted: 2019-08-08

## 2019-08-08 LAB
ALBUMIN SERPL-MCNC: 2.5 G/DL (ref 3.5–4.6)
ALP BLD-CCNC: 277 U/L (ref 40–130)
ALT SERPL-CCNC: 45 U/L (ref 0–33)
ANION GAP SERPL CALCULATED.3IONS-SCNC: 19 MEQ/L (ref 9–15)
ANISOCYTOSIS: ABNORMAL
AST SERPL-CCNC: 99 U/L (ref 0–35)
BACTERIA: ABNORMAL /HPF
BANDED NEUTROPHILS RELATIVE PERCENT: 10 % (ref 5–11)
BASOPHILS ABSOLUTE: 0 K/UL (ref 0–0.2)
BASOPHILS RELATIVE PERCENT: 1 %
BILIRUB SERPL-MCNC: 1.6 MG/DL (ref 0.2–0.7)
BILIRUBIN URINE: NEGATIVE
BLOOD, URINE: NEGATIVE
BUN BLDV-MCNC: 4 MG/DL (ref 8–23)
CALCIUM SERPL-MCNC: 7.6 MG/DL (ref 8.5–9.9)
CHLORIDE BLD-SCNC: 100 MEQ/L (ref 95–107)
CLARITY: CLEAR
CO2: 19 MEQ/L (ref 20–31)
COLOR: YELLOW
CREAT SERPL-MCNC: 0.44 MG/DL (ref 0.5–0.9)
EOSINOPHILS ABSOLUTE: 0.1 K/UL (ref 0–0.7)
EOSINOPHILS RELATIVE PERCENT: 4 %
EPITHELIAL CELLS, UA: ABNORMAL /HPF (ref 0–5)
ETHANOL PERCENT: 0.26 G/DL
ETHANOL: 291 MG/DL (ref 0–0.08)
GFR AFRICAN AMERICAN: >60
GFR NON-AFRICAN AMERICAN: >60
GLOBULIN: 3.6 G/DL (ref 2.3–3.5)
GLUCOSE BLD-MCNC: 126 MG/DL (ref 70–99)
GLUCOSE URINE: NEGATIVE MG/DL
HCT VFR BLD CALC: 31.2 % (ref 37–47)
HEMOGLOBIN: 10.5 G/DL (ref 12–16)
HYALINE CASTS: ABNORMAL /HPF (ref 0–5)
KETONES, URINE: NEGATIVE MG/DL
LACTIC ACID: 5.8 MMOL/L (ref 0.5–2.2)
LEUKOCYTE ESTERASE, URINE: ABNORMAL
LYMPHOCYTES ABSOLUTE: 0.6 K/UL (ref 1–4.8)
LYMPHOCYTES RELATIVE PERCENT: 16 %
MACROCYTES: ABNORMAL
MCH RBC QN AUTO: 35 PG (ref 27–31.3)
MCHC RBC AUTO-ENTMCNC: 33.6 % (ref 33–37)
MCV RBC AUTO: 104.2 FL (ref 82–100)
MONOCYTES ABSOLUTE: 0.1 K/UL (ref 0.2–0.8)
MONOCYTES RELATIVE PERCENT: 3 %
NEUTROPHILS ABSOLUTE: 2.8 K/UL (ref 1.4–6.5)
NEUTROPHILS RELATIVE PERCENT: 66 %
NITRITE, URINE: NEGATIVE
PDW BLD-RTO: 16.9 % (ref 11.5–14.5)
PH UA: 5 (ref 5–9)
PLATELET # BLD: 173 K/UL (ref 130–400)
PLATELET SLIDE REVIEW: NORMAL
POTASSIUM SERPL-SCNC: 4 MEQ/L (ref 3.4–4.9)
PROTEIN UA: NEGATIVE MG/DL
RBC # BLD: 3 M/UL (ref 4.2–5.4)
SODIUM BLD-SCNC: 138 MEQ/L (ref 135–144)
SPECIFIC GRAVITY UA: 1.01 (ref 1–1.03)
TOTAL PROTEIN: 6.1 G/DL (ref 6.3–8)
TSH SERPL DL<=0.05 MIU/L-ACNC: 1.36 UIU/ML (ref 0.44–3.86)
URINE REFLEX TO CULTURE: YES
UROBILINOGEN, URINE: 1 E.U./DL
WBC # BLD: 3.7 K/UL (ref 4.8–10.8)
WBC UA: ABNORMAL /HPF (ref 0–5)

## 2019-08-08 PROCEDURE — 36415 COLL VENOUS BLD VENIPUNCTURE: CPT

## 2019-08-08 PROCEDURE — G0480 DRUG TEST DEF 1-7 CLASSES: HCPCS

## 2019-08-08 PROCEDURE — 87186 SC STD MICRODIL/AGAR DIL: CPT

## 2019-08-08 PROCEDURE — 87086 URINE CULTURE/COLONY COUNT: CPT

## 2019-08-08 PROCEDURE — 80053 COMPREHEN METABOLIC PANEL: CPT

## 2019-08-08 PROCEDURE — 99285 EMERGENCY DEPT VISIT HI MDM: CPT

## 2019-08-08 PROCEDURE — 84443 ASSAY THYROID STIM HORMONE: CPT

## 2019-08-08 PROCEDURE — 85025 COMPLETE CBC W/AUTO DIFF WBC: CPT

## 2019-08-08 PROCEDURE — 6370000000 HC RX 637 (ALT 250 FOR IP): Performed by: INTERNAL MEDICINE

## 2019-08-08 PROCEDURE — 2580000003 HC RX 258: Performed by: EMERGENCY MEDICINE

## 2019-08-08 PROCEDURE — 81001 URINALYSIS AUTO W/SCOPE: CPT

## 2019-08-08 PROCEDURE — G0378 HOSPITAL OBSERVATION PER HR: HCPCS

## 2019-08-08 PROCEDURE — 2580000003 HC RX 258: Performed by: INTERNAL MEDICINE

## 2019-08-08 PROCEDURE — 71045 X-RAY EXAM CHEST 1 VIEW: CPT

## 2019-08-08 PROCEDURE — 87040 BLOOD CULTURE FOR BACTERIA: CPT

## 2019-08-08 PROCEDURE — 87077 CULTURE AEROBIC IDENTIFY: CPT

## 2019-08-08 PROCEDURE — 83605 ASSAY OF LACTIC ACID: CPT

## 2019-08-08 RX ORDER — ACETAMINOPHEN 325 MG/1
650 TABLET ORAL EVERY 4 HOURS PRN
Status: DISCONTINUED | OUTPATIENT
Start: 2019-08-08 | End: 2019-08-10 | Stop reason: HOSPADM

## 2019-08-08 RX ORDER — FOLIC ACID 1 MG/1
1 TABLET ORAL DAILY
Status: DISCONTINUED | OUTPATIENT
Start: 2019-08-09 | End: 2019-08-10 | Stop reason: HOSPADM

## 2019-08-08 RX ORDER — SODIUM CHLORIDE 9 MG/ML
INJECTION, SOLUTION INTRAVENOUS CONTINUOUS
Status: DISCONTINUED | OUTPATIENT
Start: 2019-08-08 | End: 2019-08-10 | Stop reason: HOSPADM

## 2019-08-08 RX ORDER — LORAZEPAM 1 MG/1
1 TABLET ORAL
Status: DISCONTINUED | OUTPATIENT
Start: 2019-08-08 | End: 2019-08-10 | Stop reason: HOSPADM

## 2019-08-08 RX ORDER — SODIUM CHLORIDE 0.9 % (FLUSH) 0.9 %
10 SYRINGE (ML) INJECTION EVERY 12 HOURS SCHEDULED
Status: DISCONTINUED | OUTPATIENT
Start: 2019-08-08 | End: 2019-08-10 | Stop reason: HOSPADM

## 2019-08-08 RX ORDER — SODIUM CHLORIDE 9 MG/ML
INJECTION, SOLUTION INTRAVENOUS CONTINUOUS
Status: DISCONTINUED | OUTPATIENT
Start: 2019-08-08 | End: 2019-08-09

## 2019-08-08 RX ORDER — SODIUM CHLORIDE 0.9 % (FLUSH) 0.9 %
10 SYRINGE (ML) INJECTION PRN
Status: DISCONTINUED | OUTPATIENT
Start: 2019-08-08 | End: 2019-08-10 | Stop reason: HOSPADM

## 2019-08-08 RX ORDER — THIAMINE HYDROCHLORIDE 100 MG/ML
100 INJECTION, SOLUTION INTRAMUSCULAR; INTRAVENOUS DAILY
Status: DISCONTINUED | OUTPATIENT
Start: 2019-08-09 | End: 2019-08-10 | Stop reason: HOSPADM

## 2019-08-08 RX ORDER — 0.9 % SODIUM CHLORIDE 0.9 %
1000 INTRAVENOUS SOLUTION INTRAVENOUS ONCE
Status: COMPLETED | OUTPATIENT
Start: 2019-08-08 | End: 2019-08-09

## 2019-08-08 RX ORDER — LORAZEPAM 2 MG/ML
1 INJECTION INTRAMUSCULAR
Status: DISCONTINUED | OUTPATIENT
Start: 2019-08-08 | End: 2019-08-10 | Stop reason: HOSPADM

## 2019-08-08 RX ORDER — LORAZEPAM 1 MG/1
2 TABLET ORAL
Status: DISCONTINUED | OUTPATIENT
Start: 2019-08-08 | End: 2019-08-10 | Stop reason: HOSPADM

## 2019-08-08 RX ORDER — ONDANSETRON 2 MG/ML
4 INJECTION INTRAMUSCULAR; INTRAVENOUS EVERY 6 HOURS PRN
Status: DISCONTINUED | OUTPATIENT
Start: 2019-08-08 | End: 2019-08-10 | Stop reason: HOSPADM

## 2019-08-08 RX ORDER — LORAZEPAM 1 MG/1
3 TABLET ORAL
Status: DISCONTINUED | OUTPATIENT
Start: 2019-08-08 | End: 2019-08-10 | Stop reason: HOSPADM

## 2019-08-08 RX ORDER — LORAZEPAM 2 MG/ML
4 INJECTION INTRAMUSCULAR
Status: DISCONTINUED | OUTPATIENT
Start: 2019-08-08 | End: 2019-08-10 | Stop reason: HOSPADM

## 2019-08-08 RX ORDER — LORAZEPAM 2 MG/ML
3 INJECTION INTRAMUSCULAR
Status: DISCONTINUED | OUTPATIENT
Start: 2019-08-08 | End: 2019-08-10 | Stop reason: HOSPADM

## 2019-08-08 RX ORDER — LORAZEPAM 2 MG/ML
2 INJECTION INTRAMUSCULAR
Status: DISCONTINUED | OUTPATIENT
Start: 2019-08-08 | End: 2019-08-10 | Stop reason: HOSPADM

## 2019-08-08 RX ORDER — LORAZEPAM 1 MG/1
4 TABLET ORAL
Status: DISCONTINUED | OUTPATIENT
Start: 2019-08-08 | End: 2019-08-10 | Stop reason: HOSPADM

## 2019-08-08 RX ADMIN — SODIUM CHLORIDE: 9 INJECTION, SOLUTION INTRAVENOUS at 20:05

## 2019-08-08 RX ADMIN — SODIUM CHLORIDE: 9 INJECTION, SOLUTION INTRAVENOUS at 23:48

## 2019-08-08 RX ADMIN — SODIUM CHLORIDE 1000 ML: 9 INJECTION, SOLUTION INTRAVENOUS at 21:28

## 2019-08-08 RX ADMIN — ACETAMINOPHEN 650 MG: 325 TABLET ORAL at 23:48

## 2019-08-08 ASSESSMENT — ENCOUNTER SYMPTOMS
EYE DISCHARGE: 0
CHEST TIGHTNESS: 0
PHOTOPHOBIA: 0
ABDOMINAL PAIN: 0
WHEEZING: 0
COUGH: 0
VOMITING: 0
SHORTNESS OF BREATH: 1
SORE THROAT: 0
ABDOMINAL DISTENTION: 0

## 2019-08-08 ASSESSMENT — PAIN DESCRIPTION - ORIENTATION: ORIENTATION: MID

## 2019-08-08 ASSESSMENT — PAIN DESCRIPTION - ONSET: ONSET: GRADUAL

## 2019-08-08 ASSESSMENT — PAIN SCALES - GENERAL: PAINLEVEL_OUTOF10: 4

## 2019-08-08 ASSESSMENT — PAIN DESCRIPTION - LOCATION: LOCATION: HEAD

## 2019-08-08 ASSESSMENT — PAIN DESCRIPTION - PROGRESSION: CLINICAL_PROGRESSION: NOT CHANGED

## 2019-08-08 ASSESSMENT — PAIN DESCRIPTION - DESCRIPTORS: DESCRIPTORS: THROBBING

## 2019-08-08 ASSESSMENT — PAIN DESCRIPTION - PAIN TYPE: TYPE: ACUTE PAIN

## 2019-08-08 ASSESSMENT — PAIN DESCRIPTION - FREQUENCY: FREQUENCY: INTERMITTENT

## 2019-08-09 ENCOUNTER — APPOINTMENT (OUTPATIENT)
Dept: ULTRASOUND IMAGING | Age: 64
DRG: 280 | End: 2019-08-09
Payer: MEDICAID

## 2019-08-09 PROBLEM — R74.01 TRANSAMINITIS: Status: ACTIVE | Noted: 2019-08-09

## 2019-08-09 LAB
ALBUMIN SERPL-MCNC: 1.9 G/DL (ref 3.5–4.6)
ALP BLD-CCNC: 232 U/L (ref 40–130)
ALT SERPL-CCNC: 37 U/L (ref 0–33)
ANION GAP SERPL CALCULATED.3IONS-SCNC: 15 MEQ/L (ref 9–15)
AST SERPL-CCNC: 76 U/L (ref 0–35)
BASOPHILS ABSOLUTE: 0 K/UL (ref 0–0.2)
BASOPHILS RELATIVE PERCENT: 1.3 %
BILIRUB SERPL-MCNC: 1.3 MG/DL (ref 0.2–0.7)
BUN BLDV-MCNC: 5 MG/DL (ref 8–23)
CALCIUM SERPL-MCNC: 7.1 MG/DL (ref 8.5–9.9)
CHLORIDE BLD-SCNC: 106 MEQ/L (ref 95–107)
CO2: 21 MEQ/L (ref 20–31)
CREAT SERPL-MCNC: 0.41 MG/DL (ref 0.5–0.9)
EOSINOPHILS ABSOLUTE: 0.4 K/UL (ref 0–0.7)
EOSINOPHILS RELATIVE PERCENT: 12.1 %
FOLATE: 6.1 NG/ML (ref 7.3–26.1)
GFR AFRICAN AMERICAN: >60
GFR NON-AFRICAN AMERICAN: >60
GLOBULIN: 3 G/DL (ref 2.3–3.5)
GLUCOSE BLD-MCNC: 80 MG/DL (ref 70–99)
HCT VFR BLD CALC: 26.9 % (ref 37–47)
HEMOGLOBIN: 9.2 G/DL (ref 12–16)
INR BLD: 1.1
LACTIC ACID: 3.5 MMOL/L (ref 0.5–2.2)
LACTIC ACID: 4.8 MMOL/L (ref 0.5–2.2)
LYMPHOCYTES ABSOLUTE: 1.2 K/UL (ref 1–4.8)
LYMPHOCYTES RELATIVE PERCENT: 32.4 %
MCH RBC QN AUTO: 35.5 PG (ref 27–31.3)
MCHC RBC AUTO-ENTMCNC: 34.4 % (ref 33–37)
MCV RBC AUTO: 103.3 FL (ref 82–100)
MONOCYTES ABSOLUTE: 0.5 K/UL (ref 0.2–0.8)
MONOCYTES RELATIVE PERCENT: 12.9 %
NEUTROPHILS ABSOLUTE: 1.5 K/UL (ref 1.4–6.5)
NEUTROPHILS RELATIVE PERCENT: 41.3 %
PDW BLD-RTO: 16.5 % (ref 11.5–14.5)
PLATELET # BLD: 145 K/UL (ref 130–400)
POTASSIUM REFLEX MAGNESIUM: 3.6 MEQ/L (ref 3.4–4.9)
PROTHROMBIN TIME: 15 SEC (ref 12.3–14.9)
RBC # BLD: 2.6 M/UL (ref 4.2–5.4)
SODIUM BLD-SCNC: 142 MEQ/L (ref 135–144)
TOTAL PROTEIN: 4.9 G/DL (ref 6.3–8)
VITAMIN B-12: 1406 PG/ML (ref 232–1245)
WBC # BLD: 3.6 K/UL (ref 4.8–10.8)

## 2019-08-09 PROCEDURE — 76705 ECHO EXAM OF ABDOMEN: CPT

## 2019-08-09 PROCEDURE — 80053 COMPREHEN METABOLIC PANEL: CPT

## 2019-08-09 PROCEDURE — 83516 IMMUNOASSAY NONANTIBODY: CPT

## 2019-08-09 PROCEDURE — 82746 ASSAY OF FOLIC ACID SERUM: CPT

## 2019-08-09 PROCEDURE — 6370000000 HC RX 637 (ALT 250 FOR IP): Performed by: INTERNAL MEDICINE

## 2019-08-09 PROCEDURE — 2500000003 HC RX 250 WO HCPCS: Performed by: INTERNAL MEDICINE

## 2019-08-09 PROCEDURE — 85025 COMPLETE CBC W/AUTO DIFF WBC: CPT

## 2019-08-09 PROCEDURE — 82607 VITAMIN B-12: CPT

## 2019-08-09 PROCEDURE — 85610 PROTHROMBIN TIME: CPT

## 2019-08-09 PROCEDURE — 83605 ASSAY OF LACTIC ACID: CPT

## 2019-08-09 PROCEDURE — 6360000002 HC RX W HCPCS: Performed by: INTERNAL MEDICINE

## 2019-08-09 PROCEDURE — 36415 COLL VENOUS BLD VENIPUNCTURE: CPT

## 2019-08-09 PROCEDURE — 2580000003 HC RX 258: Performed by: INTERNAL MEDICINE

## 2019-08-09 PROCEDURE — 99253 IP/OBS CNSLTJ NEW/EST LOW 45: CPT | Performed by: SPECIALIST

## 2019-08-09 PROCEDURE — 1210000000 HC MED SURG R&B

## 2019-08-09 RX ORDER — IBUPROFEN 400 MG/1
400 TABLET ORAL ONCE
Status: COMPLETED | OUTPATIENT
Start: 2019-08-09 | End: 2019-08-09

## 2019-08-09 RX ADMIN — SODIUM CHLORIDE: 9 INJECTION, SOLUTION INTRAVENOUS at 13:37

## 2019-08-09 RX ADMIN — FOLIC ACID 1 MG: 1 TABLET ORAL at 10:36

## 2019-08-09 RX ADMIN — THIAMINE HYDROCHLORIDE 100 MG: 100 INJECTION, SOLUTION INTRAMUSCULAR; INTRAVENOUS at 11:25

## 2019-08-09 RX ADMIN — FAMOTIDINE 20 MG: 10 INJECTION, SOLUTION INTRAVENOUS at 20:23

## 2019-08-09 RX ADMIN — IBUPROFEN 400 MG: 400 TABLET, FILM COATED ORAL at 21:26

## 2019-08-09 RX ADMIN — ENOXAPARIN SODIUM 40 MG: 40 INJECTION SUBCUTANEOUS at 10:36

## 2019-08-09 RX ADMIN — ACETAMINOPHEN 650 MG: 325 TABLET ORAL at 18:54

## 2019-08-09 RX ADMIN — ACETAMINOPHEN 650 MG: 325 TABLET ORAL at 13:51

## 2019-08-09 RX ADMIN — FAMOTIDINE 20 MG: 10 INJECTION, SOLUTION INTRAVENOUS at 11:25

## 2019-08-09 RX ADMIN — ACETAMINOPHEN 650 MG: 325 TABLET ORAL at 05:48

## 2019-08-09 ASSESSMENT — PAIN SCALES - GENERAL
PAINLEVEL_OUTOF10: 2
PAINLEVEL_OUTOF10: 5
PAINLEVEL_OUTOF10: 5
PAINLEVEL_OUTOF10: 4
PAINLEVEL_OUTOF10: 4

## 2019-08-09 NOTE — ED PROVIDER NOTES
[facility] ED  eMERGENCY dEPARTMENT eNCOUnter      Pt Name: Laura Orozco  MRN: [id number]  [place] 1955  Date of evaluation: 8/8/2019  Provider: Danny Cameron MD    CHIEF COMPLAINT       Chief Complaint   Patient presents with    Extremity Weakness     with increase weakness and decrease in amb. graves disease per patient. HISTORY OF PRESENT ILLNESS   (Location/Symptom, Timing/Onset,Context/Setting, Quality, Duration, Modifying Factors, Severity)  Note limiting factors. Laura Orozco is a 62 y.o. female who presents to the emergency department for evaluation of lower extremity weakness. Patient describes bilateral lower extremely weakness is gotten progressively worse over the past several months. She was admitted a month ago for urinary tract infection and some similar symptoms. She states that she uses a walker at home and today she was unable even to get up with a walker and with family assistance. Her leg weakness has progressed to the point where she cannot even bear her own weight. No related fever. No related abdominal pain. No chest pain but she does report mild shortness of breath. No associated pain. HPI    NursingNotes were reviewed. REVIEW OF SYSTEMS    (2-9 systems for level 4, 10 or more for level 5)     Review of Systems   Constitutional: Positive for fatigue. Negative for chills and diaphoresis. HENT: Negative for congestion, ear pain, mouth sores and sore throat. Eyes: Negative for photophobia and discharge. Respiratory: Positive for shortness of breath. Negative for cough, chest tightness and wheezing. Cardiovascular: Negative for chest pain and palpitations. Gastrointestinal: Negative for abdominal distention, abdominal pain and vomiting. Endocrine: Negative for cold intolerance. Genitourinary: Negative for difficulty urinating. Musculoskeletal: Negative for arthralgias and joint swelling. Skin: Negative for pallor and rash. name: None    Number of children: None    Years of education: None    Highest education level: None   Occupational History    None   Social Needs    Financial resource strain: None    Food insecurity:     Worry: None     Inability: None    Transportation needs:     Medical: None     Non-medical: None   Tobacco Use    Smoking status: Never Smoker    Smokeless tobacco: Never Used   Substance and Sexual Activity    Alcohol use: Yes     Comment: Drinks daily sometime 3 sometimes 5    Drug use: Not Currently    Sexual activity: Not Currently   Lifestyle    Physical activity:     Days per week: None     Minutes per session: None    Stress: None   Relationships    Social connections:     Talks on phone: None     Gets together: None     Attends [employer] service: None     Active member of club or organization: None     Attends meetings of clubs or organizations: None     Relationship status: None    Intimate partner violence:     Fear of current or ex partner: None     Emotionally abused: None     Physically abused: None     Forced sexual activity: None   Other Topics Concern    None   Social History Narrative    None       SCREENINGS      @FLOW([id number])@      PHYSICAL EXAM    (up to 7 for level 4, 8 or more for level 5)     ED Triage Vitals   BP Temp Temp Source Pulse Resp SpO2 Height Weight   08/08/19 1908 08/08/19 1908 08/08/19 1908 08/08/19 1908 08/08/19 1908 08/08/19 1914 08/08/19 1908 08/08/19 1908   118/72 98.3 °F (36.8 °C) Oral 85 16 99 % 5' 6\" (1.676 m) 180 lb (81.6 kg)       Physical Exam   Constitutional: She is oriented to person, place, and time. She appears well-developed. HENT:   Head: Normocephalic. Nose: Nose normal.   Eyes: Pupils are equal, round, and reactive to light. Conjunctivae are normal.   Neck: Normal range of motion. Neck supple. Cardiovascular: Normal rate, regular rhythm, normal heart sounds and intact distal pulses.    Pulmonary/Chest: Effort normal and breath sounds within normal limits   LACTIC ACID, PLASMA - Abnormal; Notable for the following components:    Lactic Acid 5.8 (*)     All other components within normal limits    Narrative:     Nicole Dominique  [facility] tel. [phone],  Lactic acid results called to and read back by DANIEL Schrader, 08/08/2019  20:56, by STELLA   URINE RT REFLEX TO CULTURE - Abnormal; Notable for the following components:    Leukocyte Esterase, Urine MODERATE (*)     All other components within normal limits   MICROSCOPIC URINALYSIS - Abnormal; Notable for the following components:    Bacteria, UA RARE (*)     WBC, UA 6-10 (*)     All other components within normal limits   URINE CULTURE   CULTURE BLOOD #1   CULTURE BLOOD #2   ETHANOL   TSH WITHOUT REFLEX       All other labs were within normal range or not returned as of this dictation. EMERGENCY DEPARTMENT COURSE and DIFFERENTIAL DIAGNOSIS/MDM:   Vitals:    Vitals:    08/08/19 1908 08/08/19 1914 08/08/19 2040   BP: 118/72  111/69   Pulse: 85  91   Resp: 16  18   Temp: 98.3 °F (36.8 °C)     TempSrc: Oral     SpO2:  99% 100%   Weight: 180 lb (81.6 kg)     Height: 5' 6\" (1.676 m)          MDM patient has an elevated lactic acid. The question is the source of the lactic acidosis. She does have a mild UTI. There also may be a right lower lobe infiltrate although she is not hypoxic. Blood cultures were done started on antibiotics and fluid. CONSULTS:  None    PROCEDURES:  Unless otherwise noted below, none     Procedures    FINAL IMPRESSION      1. Lactic acidosis    2. Acute cystitis without hematuria    3. Pneumonia due to organism          DISPOSITION/PLAN   DISPOSITION Decision To Discharge 08/08/2019 09:25:24 PM      PATIENT REFERRED TO:  No follow-up provider specified.     DISCHARGE MEDICATIONS:  New Prescriptions    No medications on file          (Please note that portions of this note were completed with a voice recognition program.  Efforts were made to edit the dictations but

## 2019-08-09 NOTE — H&P
Result Value Ref Range    Ethanol Lvl 291 mg/dL    Ethanol percent 0.255 G/dL     Radiology:  XR CHEST PORTABLE    (Results Pending)       Assessment/Plan:  1. Fatigue/generalized weakness   Likely due to liver failure and alcohol abuse   Patient was advised to stop drinking alcohol and may be go through detox   Will monitor   PT OT   Assess for needing rehab  2. Alcohol abuse/risk of withdrawal   She denied history of alcohol withdrawal   Heavy drinker of wine on a daily basis   [facility] protocol  3. Elevated liver enzymes   Patient likely has liver failure due to alcohol abuse   We will check abdominal ultrasound   GI consult  4. Leukopenia   Likely due to alcohol abuse   No sign of infection   Denies any symptoms of urinary or pulmonary infections   No need for antibiotic, will monitor  5. Macrocytic anemia   Likely due to alcohol abuse   Check B12 and folate level  6.  Lactic acidosis   Chronic lactic acidosis due to liver failure -type II lactic acidosis   We will hydrate and monitor      Deanne Rojas   Seen on 08/08/19

## 2019-08-09 NOTE — CONSULTS
Intravenous BID     Continuous Infusions:   sodium chloride 100 mL/hr at 08/08/19 2005    sodium chloride 75 mL/hr at 08/09/19 1337     PRN Meds:.sodium chloride flush, ondansetron, acetaminophen, LORazepam **OR** LORazepam **OR** LORazepam **OR** LORazepam **OR** LORazepam **OR** LORazepam **OR** LORazepam **OR** LORazepam  .   sodium chloride 100 mL/hr at 08/08/19 2005    sodium chloride 75 mL/hr at 08/09/19 1337        Allergies: Allergies   Allergen Reactions    Eggs Or Egg-Derived Products      cramping        Review of Systems:       [x] CV, Resp, Neuro, , and all other systems reviewed and negative other than listed in HPI.      [] Unable to obtain due to ventilated and/ or neurologic status      Objective Findings:     Vitals:   Vitals:    08/08/19 2140 08/08/19 2250 08/09/19 0553 08/09/19 0800   BP: (!) 107/58 116/70 125/69 (!) 104/54   Pulse: 82 81 87 89   Resp: 18   18   Temp:  97.7 °F (36.5 °C)  98.1 °F (36.7 °C)   TempSrc:    Oral   SpO2: 100% 100%  97%   Weight:       Height:            Physical Examination:  General: In no acute distress  HEENT: Normocephalic, scleral icterus. None. No other stigmata of chronic liver disease  Neck: No jugular venous distention. Heart: Irregular, no murmur, no rub/gallop. No right ventricular heave. Lungs: Clear to ascultation, no rales/wheezing/rhonchi. Good chest wall excursion. Abdomen: Distension none,  Soft, tenderness none, Scars none, Bowel sounds present  Extremities: No clubbing/cyanosis, 2+ edema  Skin: Warm, dry, normal turgor, no rash, no bruise, no petichiae. Neuro: No myoclonus or tremor.    Psych: Normal affect    Results/ Medications reviewed 8/9/2019, 4:22 PM     Laboratory, Microbiology, Pathology, Radiology, Cardiology, Medications and Transcriptions reviewed  Scheduled Meds:   sodium chloride flush  10 mL Intravenous 2 times per day    enoxaparin  40 mg Subcutaneous Daily    thiamine  100 mg Intramuscular Daily    folic acid  1 mg Oral Daily    famotidine (PEPCID) injection  20 mg Intravenous BID     Continuous Infusions:   sodium chloride 100 mL/hr at 08/08/19 2005    sodium chloride 75 mL/hr at 08/09/19 1337       Recent Labs     08/08/19 2001 08/09/19  0531   WBC 3.7* 3.6*   HGB 10.5* 9.2*   HCT 31.2* 26.9*   .2* 103.3*    145     Recent Labs     08/08/19 2001 08/09/19  0531    142   K 4.0 3.6    106   CO2 19* 21   BUN 4* 5*   CREATININE 0.44* 0.41*     Recent Labs     08/08/19 2001 08/09/19  0531   AST 99* 76*   ALT 45* 37*   BILITOT 1.6* 1.3*   ALKPHOS 277* 232*     No results for input(s): LIPASE, AMYLASE in the last 72 hours. Recent Labs     08/08/19 2001 08/09/19  0531   PROT 6.1* 4.9*     Ct Head Wo Contrast    Result Date: 7/23/2019  EXAMINATION:  CT HEAD WO CONTRAST CLINICAL HISTORY:   fall with head injury . Chief complaint multiple falls, striking posterior aspect of head, eval for bleed/ fx COMPARISONS:  May 28, 2019 TECHNIQUE:  Spiral axial images of brain were obtained without contrast enhancement. Multiplanar two-dimensional reformatting was completed at CT console. FINDINGS:  There is moderate generalized age-related atrophy. There is atrophy related ventriculomegaly. There is widespread age-related white matter hypodensity. There is a 6 mm focus of encephalomalacia in the distribution of the left anterior cerebral  artery, high in the left frontal lobe. There is no cerebral edema. There is no intracranial hemorrhage/hematoma. The skull is intact. There is no pneumocephalus. There is no orbital emphysema. There is no fluid in paranasal sinuses. Temporal bones are grossly normal.     AGE-RELATED FINDINGS, UNCHANGED SINCE MAY 28, 2019. NO SIGN OF ACUTE TRAUMATIC BRAIN INJURY. All CT scans at this facility use dose modulation, iterative reconstruction, and/or weight based dosing when appropriate to reduce radiation dose to as low as reasonably achievable.     Ct Cervical Spine Wo

## 2019-08-09 NOTE — ED NOTES
This tech assisted patient to restroom via w/c. No distress noted.       Galina Patch  08/08/19 2022

## 2019-08-10 VITALS
HEIGHT: 66 IN | OXYGEN SATURATION: 100 % | WEIGHT: 180 LBS | SYSTOLIC BLOOD PRESSURE: 130 MMHG | BODY MASS INDEX: 28.93 KG/M2 | DIASTOLIC BLOOD PRESSURE: 69 MMHG | HEART RATE: 87 BPM | RESPIRATION RATE: 16 BRPM | TEMPERATURE: 99.1 F

## 2019-08-10 LAB
ALBUMIN SERPL-MCNC: 2.1 G/DL (ref 3.5–4.6)
ALP BLD-CCNC: 256 U/L (ref 40–130)
ALT SERPL-CCNC: 50 U/L (ref 0–33)
ANION GAP SERPL CALCULATED.3IONS-SCNC: 13 MEQ/L (ref 9–15)
AST SERPL-CCNC: 182 U/L (ref 0–35)
BASOPHILS ABSOLUTE: 0 K/UL (ref 0–0.2)
BASOPHILS RELATIVE PERCENT: 1 %
BILIRUB SERPL-MCNC: 2 MG/DL (ref 0.2–0.7)
BUN BLDV-MCNC: 6 MG/DL (ref 8–23)
CALCIUM SERPL-MCNC: 7.4 MG/DL (ref 8.5–9.9)
CHLORIDE BLD-SCNC: 103 MEQ/L (ref 95–107)
CO2: 21 MEQ/L (ref 20–31)
CREAT SERPL-MCNC: 0.49 MG/DL (ref 0.5–0.9)
EOSINOPHILS ABSOLUTE: 0.2 K/UL (ref 0–0.7)
EOSINOPHILS RELATIVE PERCENT: 6 %
FERRITIN: 556.3 NG/ML (ref 13–150)
GFR AFRICAN AMERICAN: >60
GFR NON-AFRICAN AMERICAN: >60
GLOBULIN: 2.8 G/DL (ref 2.3–3.5)
GLUCOSE BLD-MCNC: 98 MG/DL (ref 70–99)
HCT VFR BLD CALC: 27.1 % (ref 37–47)
HEMOGLOBIN: 9.4 G/DL (ref 12–16)
LYMPHOCYTES ABSOLUTE: 0.4 K/UL (ref 1–4.8)
LYMPHOCYTES RELATIVE PERCENT: 12 %
MCH RBC QN AUTO: 35.5 PG (ref 27–31.3)
MCHC RBC AUTO-ENTMCNC: 34.6 % (ref 33–37)
MCV RBC AUTO: 102.7 FL (ref 82–100)
MONOCYTES ABSOLUTE: 0 K/UL (ref 0.2–0.8)
MONOCYTES RELATIVE PERCENT: 1 %
NEUTROPHILS ABSOLUTE: 2.9 K/UL (ref 1.4–6.5)
NEUTROPHILS RELATIVE PERCENT: 81 %
PDW BLD-RTO: 17 % (ref 11.5–14.5)
PLATELET # BLD: 133 K/UL (ref 130–400)
POTASSIUM REFLEX MAGNESIUM: 3.6 MEQ/L (ref 3.4–4.9)
RBC # BLD: 2.64 M/UL (ref 4.2–5.4)
SMUDGE CELLS: 1.9
SODIUM BLD-SCNC: 137 MEQ/L (ref 135–144)
TOTAL PROTEIN: 4.9 G/DL (ref 6.3–8)
WBC # BLD: 3.6 K/UL (ref 4.8–10.8)

## 2019-08-10 PROCEDURE — 86039 ANTINUCLEAR ANTIBODIES (ANA): CPT

## 2019-08-10 PROCEDURE — 83516 IMMUNOASSAY NONANTIBODY: CPT

## 2019-08-10 PROCEDURE — 2500000003 HC RX 250 WO HCPCS: Performed by: INTERNAL MEDICINE

## 2019-08-10 PROCEDURE — 36415 COLL VENOUS BLD VENIPUNCTURE: CPT

## 2019-08-10 PROCEDURE — 2580000003 HC RX 258: Performed by: INTERNAL MEDICINE

## 2019-08-10 PROCEDURE — 97161 PT EVAL LOW COMPLEX 20 MIN: CPT

## 2019-08-10 PROCEDURE — 6360000002 HC RX W HCPCS: Performed by: INTERNAL MEDICINE

## 2019-08-10 PROCEDURE — 85025 COMPLETE CBC W/AUTO DIFF WBC: CPT

## 2019-08-10 PROCEDURE — 80053 COMPREHEN METABOLIC PANEL: CPT

## 2019-08-10 PROCEDURE — 82728 ASSAY OF FERRITIN: CPT

## 2019-08-10 PROCEDURE — 6370000000 HC RX 637 (ALT 250 FOR IP): Performed by: INTERNAL MEDICINE

## 2019-08-10 RX ORDER — CYCLOBENZAPRINE HCL 5 MG
5 TABLET ORAL 2 TIMES DAILY PRN
Qty: 30 TABLET | Refills: 1 | Status: SHIPPED | OUTPATIENT
Start: 2019-08-10 | End: 2019-08-25

## 2019-08-10 RX ADMIN — FOLIC ACID 1 MG: 1 TABLET ORAL at 09:18

## 2019-08-10 RX ADMIN — ENOXAPARIN SODIUM 40 MG: 40 INJECTION SUBCUTANEOUS at 09:18

## 2019-08-10 RX ADMIN — THIAMINE HYDROCHLORIDE 100 MG: 100 INJECTION, SOLUTION INTRAMUSCULAR; INTRAVENOUS at 09:18

## 2019-08-10 RX ADMIN — FAMOTIDINE 20 MG: 10 INJECTION, SOLUTION INTRAVENOUS at 09:18

## 2019-08-10 RX ADMIN — SODIUM CHLORIDE: 9 INJECTION, SOLUTION INTRAVENOUS at 02:31

## 2019-08-10 RX ADMIN — ACETAMINOPHEN 650 MG: 325 TABLET ORAL at 05:30

## 2019-08-10 RX ADMIN — ACETAMINOPHEN 650 MG: 325 TABLET ORAL at 11:06

## 2019-08-10 RX ADMIN — Medication 10 ML: at 09:18

## 2019-08-10 ASSESSMENT — PAIN SCALES - GENERAL
PAINLEVEL_OUTOF10: 2
PAINLEVEL_OUTOF10: 5
PAINLEVEL_OUTOF10: 4

## 2019-08-10 NOTE — DISCHARGE SUMMARY
Physician Discharge Summary     Patient ID:  Estevan Ann  [id number]  64 y.o.  1955    Admit date: 8/8/2019    Discharge date and time: 8/10/19    Admitting Physician: Meagan Alcazar MD     Discharge Physician:  Natan Tierney    Admission Diagnoses: General weakness [R53.1]  Transaminitis [R74.0]    Discharge Diagnoses:   - Etoh abuse  --debility  - alcoholic liver disease    Admission Condition: poor    Discharged Condition: good    Indication for Admission: weakness/debility    Hospital Course:    Patient was admitted for generalized body weakness. He was assessed to have alcohol abuse, alcoholic liver disease with transaminitis; and debility. His condition improved and was stable clinically.  He was evaluated by PT/OT team.    Consults: none    Significant Diagnostic Studies: radiology: CXR: atelectasis bilaterally    Treatments: IV hydration    Discharge Exam:  /69   Pulse 87   Temp 99.1 °F (37.3 °C) (Oral)   Resp 16   Ht 5' 6\" (1.676 m)   Wt 180 lb (81.6 kg)   LMP  (LMP Unknown)   SpO2 100%   BMI 29.05 kg/m²     General Appearance:    Alert, cooperative, no distress, appears stated age   Head:    Normocephalic, without obvious abnormality, atraumatic   Eyes:    PERRL, conjunctiva/corneas clear, EOM's intact, fundi     benign, both eyes        Ears:    Normal TM's and external ear canals, both ears   Nose:   Nares normal, septum midline, mucosa normal, no drainage    or sinus tenderness   Throat:   Lips, mucosa, and tongue normal; teeth and gums normal   Neck:   Supple, symmetrical, trachea midline, no adenopathy;        thyroid:  No enlargement/tenderness/nodules; no carotid    bruit or JVD   Back:     Symmetric, no curvature, ROM normal, no CVA tenderness   Lungs:     Clear to auscultation bilaterally, respirations unlabored   Chest wall:    No tenderness or deformity   Heart:    Regular rate and rhythm, S1 and S2 normal, no murmur, rub   or gallop   Abdomen:     Soft, non-tender, bowel daily  Qty: 30 tablet, Refills: 3           Activity: activity as tolerated  Diet: regular diet and cardiac diet  Wound Care: keep wound clean and dry and none needed    Follow-up with PCP in 1 month.     Signed:  Juan A Mora  8/10/2019  2:04 PM

## 2019-08-10 NOTE — FLOWSHEET NOTE
1600- Saline lock removed, telemetry removed and returned to [address] [place] [address]. 1635- PATIENT  DISCHARGED VIA WHEELCHAIR WITH BELONGINGS, DISCHARGE INSTRUCTIONS AND  EDUCATIONAL MATERIALS AND 1 SCRIPT.

## 2019-08-10 NOTE — PROGRESS NOTES
2250 up to bathroom, patient states she has been to bathroom multiple times today without being able to urinate, but feels the urge to do so, once patient stated she was finished and returned to bed, bladder scan obtained for approx 200 cc of urine, Dr Sadi Chandler notified of bladder scan and inability to urinate, he states he will look at her chart

## 2019-08-11 LAB
ORGANISM: ABNORMAL
ORGANISM: ABNORMAL
URINE CULTURE, ROUTINE: ABNORMAL
URINE CULTURE, ROUTINE: ABNORMAL

## 2019-08-12 LAB
ANTINUCLEAR AB INTERPRETIVE COMMENT: NORMAL
ANTINUCLEAR ANTIBODY, HEP-2, IGG: NORMAL
MITOCHONDRIAL M2 AB, IGG: 4.3 UNITS (ref 0–20)

## 2019-08-13 LAB — F-ACTIN AB IGA: 17.6 UNITS (ref 0–24.9)

## 2019-08-14 LAB
BLOOD CULTURE, ROUTINE: NORMAL
CULTURE, BLOOD 2: NORMAL

## 2019-09-08 ENCOUNTER — HOSPITAL ENCOUNTER (INPATIENT)
Age: 64
LOS: 5 days | Discharge: NURSING, REHAB OR LONG-TERM CARE | DRG: 426 | End: 2019-09-13
Attending: INTERNAL MEDICINE | Admitting: INTERNAL MEDICINE
Payer: MEDICAID

## 2019-09-08 ENCOUNTER — APPOINTMENT (OUTPATIENT)
Dept: GENERAL RADIOLOGY | Age: 64
DRG: 426 | End: 2019-09-08
Payer: MEDICAID

## 2019-09-08 ENCOUNTER — APPOINTMENT (OUTPATIENT)
Dept: CT IMAGING | Age: 64
DRG: 426 | End: 2019-09-08
Payer: MEDICAID

## 2019-09-08 DIAGNOSIS — E87.1 HYPONATREMIA: ICD-10-CM

## 2019-09-08 DIAGNOSIS — L89.152 PRESSURE INJURY OF SACRAL REGION, STAGE 2 (HCC): ICD-10-CM

## 2019-09-08 LAB
ALBUMIN SERPL-MCNC: 2.1 G/DL (ref 3.5–4.6)
ALP BLD-CCNC: 397 U/L (ref 40–130)
ALT SERPL-CCNC: 76 U/L (ref 0–33)
AMMONIA: 42 UMOL/L (ref 11–51)
ANION GAP SERPL CALCULATED.3IONS-SCNC: 13 MEQ/L (ref 9–15)
ANISOCYTOSIS: ABNORMAL
APTT: 31.8 SEC (ref 24.4–36.8)
AST SERPL-CCNC: 85 U/L (ref 0–35)
BASOPHILS ABSOLUTE: 0 K/UL (ref 0–0.2)
BASOPHILS RELATIVE PERCENT: 0.4 %
BILIRUB SERPL-MCNC: 4.4 MG/DL (ref 0.2–0.7)
BUN BLDV-MCNC: 11 MG/DL (ref 8–23)
CALCIUM SERPL-MCNC: 7.5 MG/DL (ref 8.5–9.9)
CHLORIDE BLD-SCNC: 86 MEQ/L (ref 95–107)
CO2: 22 MEQ/L (ref 20–31)
CREAT SERPL-MCNC: 0.87 MG/DL (ref 0.5–0.9)
EOSINOPHILS ABSOLUTE: 0.1 K/UL (ref 0–0.7)
EOSINOPHILS RELATIVE PERCENT: 1.2 %
ETHANOL PERCENT: 0.04 G/DL
ETHANOL: 50 MG/DL (ref 0–0.08)
GFR AFRICAN AMERICAN: >60
GFR NON-AFRICAN AMERICAN: >60
GLOBULIN: 3.6 G/DL (ref 2.3–3.5)
GLUCOSE BLD-MCNC: 105 MG/DL (ref 60–115)
GLUCOSE BLD-MCNC: 70 MG/DL (ref 60–115)
GLUCOSE BLD-MCNC: 70 MG/DL (ref 70–99)
HCT VFR BLD CALC: 29.4 % (ref 37–47)
HEMOGLOBIN: 9.7 G/DL (ref 12–16)
HYPOCHROMIA: ABNORMAL
INR BLD: 1.3
LACTIC ACID: 2.7 MMOL/L (ref 0.5–2.2)
LACTIC ACID: 3.3 MMOL/L (ref 0.5–2.2)
LIPASE: 6 U/L (ref 12–95)
LYMPHOCYTES ABSOLUTE: 0.8 K/UL (ref 1–4.8)
LYMPHOCYTES RELATIVE PERCENT: 18.8 %
MACROCYTES: ABNORMAL
MAGNESIUM: 1.9 MG/DL (ref 1.7–2.4)
MCH RBC QN AUTO: 35.4 PG (ref 27–31.3)
MCHC RBC AUTO-ENTMCNC: 33.1 % (ref 33–37)
MCV RBC AUTO: 106.9 FL (ref 82–100)
MONOCYTES ABSOLUTE: 0.2 K/UL (ref 0.2–0.8)
MONOCYTES RELATIVE PERCENT: 4.7 %
NEUTROPHILS ABSOLUTE: 3.3 K/UL (ref 1.4–6.5)
NEUTROPHILS RELATIVE PERCENT: 74.9 %
OSMOLALITY: 269 MOSM/KG (ref 280–303)
PDW BLD-RTO: 18.8 % (ref 11.5–14.5)
PERFORMED ON: NORMAL
PERFORMED ON: NORMAL
PLATELET # BLD: 201 K/UL (ref 130–400)
POIKILOCYTES: ABNORMAL
POTASSIUM SERPL-SCNC: 4.7 MEQ/L (ref 3.4–4.9)
PROTHROMBIN TIME: 16.6 SEC (ref 12.3–14.9)
RBC # BLD: 2.75 M/UL (ref 4.2–5.4)
SODIUM BLD-SCNC: 121 MEQ/L (ref 135–144)
STOMATOCYTES: ABNORMAL
TOTAL CK: 80 U/L (ref 0–170)
TOTAL PROTEIN: 5.7 G/DL (ref 6.3–8)
TROPONIN: <0.01 NG/ML (ref 0–0.01)
TSH REFLEX: 3.51 UIU/ML (ref 0.44–3.86)
TSH SERPL DL<=0.05 MIU/L-ACNC: 2.92 UIU/ML (ref 0.44–3.86)
WBC # BLD: 4.5 K/UL (ref 4.8–10.8)

## 2019-09-08 PROCEDURE — 80053 COMPREHEN METABOLIC PANEL: CPT

## 2019-09-08 PROCEDURE — 83935 ASSAY OF URINE OSMOLALITY: CPT

## 2019-09-08 PROCEDURE — 71045 X-RAY EXAM CHEST 1 VIEW: CPT

## 2019-09-08 PROCEDURE — 6360000002 HC RX W HCPCS: Performed by: NURSE PRACTITIONER

## 2019-09-08 PROCEDURE — 83690 ASSAY OF LIPASE: CPT

## 2019-09-08 PROCEDURE — 2580000003 HC RX 258: Performed by: NURSE PRACTITIONER

## 2019-09-08 PROCEDURE — 6370000000 HC RX 637 (ALT 250 FOR IP): Performed by: NURSE PRACTITIONER

## 2019-09-08 PROCEDURE — G0480 DRUG TEST DEF 1-7 CLASSES: HCPCS

## 2019-09-08 PROCEDURE — 72125 CT NECK SPINE W/O DYE: CPT

## 2019-09-08 PROCEDURE — 93005 ELECTROCARDIOGRAM TRACING: CPT | Performed by: NURSE PRACTITIONER

## 2019-09-08 PROCEDURE — 6370000000 HC RX 637 (ALT 250 FOR IP): Performed by: INTERNAL MEDICINE

## 2019-09-08 PROCEDURE — 87077 CULTURE AEROBIC IDENTIFY: CPT

## 2019-09-08 PROCEDURE — 82550 ASSAY OF CK (CPK): CPT

## 2019-09-08 PROCEDURE — 82140 ASSAY OF AMMONIA: CPT

## 2019-09-08 PROCEDURE — 2500000003 HC RX 250 WO HCPCS: Performed by: NURSE PRACTITIONER

## 2019-09-08 PROCEDURE — 72170 X-RAY EXAM OF PELVIS: CPT

## 2019-09-08 PROCEDURE — 80307 DRUG TEST PRSMV CHEM ANLYZR: CPT

## 2019-09-08 PROCEDURE — 85610 PROTHROMBIN TIME: CPT

## 2019-09-08 PROCEDURE — 84443 ASSAY THYROID STIM HORMONE: CPT

## 2019-09-08 PROCEDURE — 96365 THER/PROPH/DIAG IV INF INIT: CPT

## 2019-09-08 PROCEDURE — 87040 BLOOD CULTURE FOR BACTERIA: CPT

## 2019-09-08 PROCEDURE — 85025 COMPLETE CBC W/AUTO DIFF WBC: CPT

## 2019-09-08 PROCEDURE — 85730 THROMBOPLASTIN TIME PARTIAL: CPT

## 2019-09-08 PROCEDURE — 83605 ASSAY OF LACTIC ACID: CPT

## 2019-09-08 PROCEDURE — 99285 EMERGENCY DEPT VISIT HI MDM: CPT

## 2019-09-08 PROCEDURE — 87086 URINE CULTURE/COLONY COUNT: CPT

## 2019-09-08 PROCEDURE — 84484 ASSAY OF TROPONIN QUANT: CPT

## 2019-09-08 PROCEDURE — 83735 ASSAY OF MAGNESIUM: CPT

## 2019-09-08 PROCEDURE — 87186 SC STD MICRODIL/AGAR DIL: CPT

## 2019-09-08 PROCEDURE — 70450 CT HEAD/BRAIN W/O DYE: CPT

## 2019-09-08 PROCEDURE — 81001 URINALYSIS AUTO W/SCOPE: CPT

## 2019-09-08 PROCEDURE — 83930 ASSAY OF BLOOD OSMOLALITY: CPT

## 2019-09-08 PROCEDURE — 36415 COLL VENOUS BLD VENIPUNCTURE: CPT

## 2019-09-08 PROCEDURE — 1210000000 HC MED SURG R&B

## 2019-09-08 RX ORDER — ONDANSETRON 2 MG/ML
4 INJECTION INTRAMUSCULAR; INTRAVENOUS EVERY 6 HOURS PRN
Status: CANCELLED | OUTPATIENT
Start: 2019-09-08

## 2019-09-08 RX ORDER — 0.9 % SODIUM CHLORIDE 0.9 %
1000 INTRAVENOUS SOLUTION INTRAVENOUS ONCE
Status: COMPLETED | OUTPATIENT
Start: 2019-09-08 | End: 2019-09-08

## 2019-09-08 RX ORDER — LORAZEPAM 1 MG/1
1 TABLET ORAL
Status: DISCONTINUED | OUTPATIENT
Start: 2019-09-08 | End: 2019-09-13 | Stop reason: HOSPADM

## 2019-09-08 RX ORDER — LORAZEPAM 1 MG/1
3 TABLET ORAL
Status: DISCONTINUED | OUTPATIENT
Start: 2019-09-08 | End: 2019-09-13 | Stop reason: HOSPADM

## 2019-09-08 RX ORDER — LORAZEPAM 2 MG/ML
4 INJECTION INTRAMUSCULAR
Status: DISCONTINUED | OUTPATIENT
Start: 2019-09-08 | End: 2019-09-13 | Stop reason: HOSPADM

## 2019-09-08 RX ORDER — SODIUM CHLORIDE 9 MG/ML
INJECTION, SOLUTION INTRAVENOUS CONTINUOUS
Status: DISCONTINUED | OUTPATIENT
Start: 2019-09-08 | End: 2019-09-10

## 2019-09-08 RX ORDER — LORAZEPAM 2 MG/ML
1 INJECTION INTRAMUSCULAR
Status: DISCONTINUED | OUTPATIENT
Start: 2019-09-08 | End: 2019-09-13 | Stop reason: HOSPADM

## 2019-09-08 RX ORDER — LORAZEPAM 1 MG/1
4 TABLET ORAL
Status: DISCONTINUED | OUTPATIENT
Start: 2019-09-08 | End: 2019-09-13 | Stop reason: HOSPADM

## 2019-09-08 RX ORDER — THIAMINE MONONITRATE (VIT B1) 100 MG
100 TABLET ORAL DAILY
Status: DISCONTINUED | OUTPATIENT
Start: 2019-09-08 | End: 2019-09-13 | Stop reason: HOSPADM

## 2019-09-08 RX ORDER — LORAZEPAM 2 MG/ML
2 INJECTION INTRAMUSCULAR
Status: DISCONTINUED | OUTPATIENT
Start: 2019-09-08 | End: 2019-09-13 | Stop reason: HOSPADM

## 2019-09-08 RX ORDER — LORAZEPAM 2 MG/ML
3 INJECTION INTRAMUSCULAR
Status: DISCONTINUED | OUTPATIENT
Start: 2019-09-08 | End: 2019-09-13 | Stop reason: HOSPADM

## 2019-09-08 RX ORDER — FOLIC ACID 1 MG/1
1 TABLET ORAL DAILY
Status: DISCONTINUED | OUTPATIENT
Start: 2019-09-08 | End: 2019-09-13 | Stop reason: HOSPADM

## 2019-09-08 RX ORDER — FAMOTIDINE 20 MG/1
20 TABLET, FILM COATED ORAL 2 TIMES DAILY
Status: DISCONTINUED | OUTPATIENT
Start: 2019-09-08 | End: 2019-09-13 | Stop reason: HOSPADM

## 2019-09-08 RX ORDER — SODIUM CHLORIDE 0.9 % (FLUSH) 0.9 %
10 SYRINGE (ML) INJECTION PRN
Status: DISCONTINUED | OUTPATIENT
Start: 2019-09-08 | End: 2019-09-13 | Stop reason: HOSPADM

## 2019-09-08 RX ORDER — SODIUM CHLORIDE 0.9 % (FLUSH) 0.9 %
10 SYRINGE (ML) INJECTION EVERY 12 HOURS SCHEDULED
Status: DISCONTINUED | OUTPATIENT
Start: 2019-09-08 | End: 2019-09-13 | Stop reason: HOSPADM

## 2019-09-08 RX ORDER — LORAZEPAM 1 MG/1
2 TABLET ORAL
Status: DISCONTINUED | OUTPATIENT
Start: 2019-09-08 | End: 2019-09-13 | Stop reason: HOSPADM

## 2019-09-08 RX ORDER — ONDANSETRON 2 MG/ML
4 INJECTION INTRAMUSCULAR; INTRAVENOUS EVERY 6 HOURS PRN
Status: DISCONTINUED | OUTPATIENT
Start: 2019-09-08 | End: 2019-09-13 | Stop reason: HOSPADM

## 2019-09-08 RX ORDER — ACETAMINOPHEN 325 MG/1
650 TABLET ORAL EVERY 4 HOURS PRN
Status: DISCONTINUED | OUTPATIENT
Start: 2019-09-08 | End: 2019-09-12

## 2019-09-08 RX ADMIN — FOLIC ACID: 5 INJECTION, SOLUTION INTRAMUSCULAR; INTRAVENOUS; SUBCUTANEOUS at 13:08

## 2019-09-08 RX ADMIN — SODIUM CHLORIDE 1000 ML: 9 INJECTION, SOLUTION INTRAVENOUS at 12:53

## 2019-09-08 RX ADMIN — FOLIC ACID 1 MG: 1 TABLET ORAL at 17:05

## 2019-09-08 RX ADMIN — ENOXAPARIN SODIUM 40 MG: 40 INJECTION SUBCUTANEOUS at 17:06

## 2019-09-08 RX ADMIN — SODIUM CHLORIDE: 9 INJECTION, SOLUTION INTRAVENOUS at 17:06

## 2019-09-08 RX ADMIN — ACETAMINOPHEN 650 MG: 325 TABLET ORAL at 21:13

## 2019-09-08 RX ADMIN — Medication 100 MG: at 17:05

## 2019-09-08 RX ADMIN — FAMOTIDINE 20 MG: 20 TABLET ORAL at 21:13

## 2019-09-08 RX ADMIN — ACETAMINOPHEN 650 MG: 325 TABLET ORAL at 17:05

## 2019-09-08 ASSESSMENT — ENCOUNTER SYMPTOMS
NAUSEA: 0
CHOKING: 0
EYES NEGATIVE: 1
WHEEZING: 0
SORE THROAT: 0
RECTAL PAIN: 0
ALLERGIC/IMMUNOLOGIC NEGATIVE: 1
ABDOMINAL DISTENTION: 1
RHINORRHEA: 0
VOMITING: 0
CONSTIPATION: 0
DIARRHEA: 0
RESPIRATORY NEGATIVE: 1
COUGH: 0
BLOOD IN STOOL: 0
BACK PAIN: 1
ANAL BLEEDING: 0
ABDOMINAL PAIN: 1
CHEST TIGHTNESS: 0
PHOTOPHOBIA: 0
SHORTNESS OF BREATH: 0
EYE PAIN: 0
ABDOMINAL PAIN: 0
APNEA: 0
COLOR CHANGE: 1
BACK PAIN: 0
STRIDOR: 0

## 2019-09-08 ASSESSMENT — PAIN DESCRIPTION - DESCRIPTORS: DESCRIPTORS: ACHING;BURNING

## 2019-09-08 ASSESSMENT — PAIN DESCRIPTION - LOCATION: LOCATION: ABDOMEN;CHEST

## 2019-09-08 ASSESSMENT — PAIN SCALES - GENERAL
PAINLEVEL_OUTOF10: 3

## 2019-09-08 ASSESSMENT — PAIN DESCRIPTION - PAIN TYPE: TYPE: CHRONIC PAIN

## 2019-09-08 ASSESSMENT — PAIN DESCRIPTION - PROGRESSION: CLINICAL_PROGRESSION: NOT CHANGED

## 2019-09-08 ASSESSMENT — PAIN DESCRIPTION - FREQUENCY: FREQUENCY: CONTINUOUS

## 2019-09-09 ENCOUNTER — APPOINTMENT (OUTPATIENT)
Dept: ULTRASOUND IMAGING | Age: 64
DRG: 426 | End: 2019-09-09
Payer: MEDICAID

## 2019-09-09 ENCOUNTER — APPOINTMENT (OUTPATIENT)
Dept: GENERAL RADIOLOGY | Age: 64
DRG: 426 | End: 2019-09-09
Payer: MEDICAID

## 2019-09-09 LAB
ALBUMIN SERPL-MCNC: 2.1 G/DL (ref 3.5–4.6)
ALP BLD-CCNC: 358 U/L (ref 40–130)
ALT SERPL-CCNC: 78 U/L (ref 0–33)
AMMONIA: 115 UMOL/L (ref 11–51)
AMPHETAMINE SCREEN, URINE: NORMAL
ANION GAP SERPL CALCULATED.3IONS-SCNC: 14 MEQ/L (ref 9–15)
ANISOCYTOSIS: ABNORMAL
APTT: 39.1 SEC (ref 24.4–36.8)
AST SERPL-CCNC: 86 U/L (ref 0–35)
BACTERIA: ABNORMAL /HPF
BANDED NEUTROPHILS RELATIVE PERCENT: 4 % (ref 5–11)
BARBITURATE SCREEN URINE: NORMAL
BASOPHILS ABSOLUTE: 0 K/UL (ref 0–0.2)
BASOPHILS RELATIVE PERCENT: 0.8 %
BENZODIAZEPINE SCREEN, URINE: NORMAL
BILIRUB SERPL-MCNC: 5 MG/DL (ref 0.2–0.7)
BILIRUBIN DIRECT: 3.8 MG/DL (ref 0–0.4)
BILIRUBIN URINE: ABNORMAL
BILIRUBIN, INDIRECT: 1.2 MG/DL (ref 0–0.6)
BLOOD, URINE: ABNORMAL
BUN BLDV-MCNC: 12 MG/DL (ref 8–23)
C-REACTIVE PROTEIN, HIGH SENSITIVITY: 74.4 MG/L (ref 0–5)
CALCIUM SERPL-MCNC: 7.3 MG/DL (ref 8.5–9.9)
CANNABINOID SCREEN URINE: NORMAL
CHLORIDE BLD-SCNC: 90 MEQ/L (ref 95–107)
CLARITY: ABNORMAL
CO2: 21 MEQ/L (ref 20–31)
COCAINE METABOLITE SCREEN URINE: NORMAL
COLOR: ABNORMAL
CREAT SERPL-MCNC: 1.32 MG/DL (ref 0.5–0.9)
EKG ATRIAL RATE: 394 BPM
EKG ATRIAL RATE: 82 BPM
EKG ATRIAL RATE: 86 BPM
EKG P AXIS: 32 DEGREES
EKG P AXIS: 42 DEGREES
EKG P AXIS: 45 DEGREES
EKG P-R INTERVAL: 162 MS
EKG P-R INTERVAL: 168 MS
EKG Q-T INTERVAL: 390 MS
EKG Q-T INTERVAL: 406 MS
EKG Q-T INTERVAL: 408 MS
EKG QRS DURATION: 100 MS
EKG QRS DURATION: 94 MS
EKG QRS DURATION: 96 MS
EKG QTC CALCULATION (BAZETT): 459 MS
EKG QTC CALCULATION (BAZETT): 466 MS
EKG QTC CALCULATION (BAZETT): 474 MS
EKG R AXIS: 11 DEGREES
EKG R AXIS: 40 DEGREES
EKG R AXIS: 56 DEGREES
EKG T AXIS: 22 DEGREES
EKG T AXIS: 56 DEGREES
EKG T AXIS: 58 DEGREES
EKG VENTRICULAR RATE: 76 BPM
EKG VENTRICULAR RATE: 82 BPM
EKG VENTRICULAR RATE: 86 BPM
EOSINOPHILS ABSOLUTE: 0 K/UL (ref 0–0.7)
EOSINOPHILS RELATIVE PERCENT: 0.6 %
EPITHELIAL CELLS, UA: >100 /HPF (ref 0–5)
GAMMA GLUTAMYL TRANSFERASE: 365 U/L (ref 0–40)
GFR AFRICAN AMERICAN: 49.1
GFR NON-AFRICAN AMERICAN: 40.6
GLUCOSE BLD-MCNC: 81 MG/DL (ref 70–99)
GLUCOSE BLD-MCNC: 83 MG/DL (ref 60–115)
GLUCOSE URINE: NEGATIVE MG/DL
HCT VFR BLD CALC: 27 % (ref 37–47)
HEMOGLOBIN: 8.8 G/DL (ref 12–16)
HYPOCHROMIA: ABNORMAL
INR BLD: 1.5
KETONES, URINE: NEGATIVE MG/DL
LEUKOCYTE ESTERASE, URINE: ABNORMAL
LYMPHOCYTES ABSOLUTE: 0.2 K/UL (ref 1–4.8)
LYMPHOCYTES RELATIVE PERCENT: 3 %
Lab: NORMAL
MACROCYTES: ABNORMAL
MAGNESIUM: 1.7 MG/DL (ref 1.7–2.4)
MCH RBC QN AUTO: 35.6 PG (ref 27–31.3)
MCHC RBC AUTO-ENTMCNC: 32.6 % (ref 33–37)
MCV RBC AUTO: 109.3 FL (ref 82–100)
MONOCYTES ABSOLUTE: 0.1 K/UL (ref 0.2–0.8)
MONOCYTES RELATIVE PERCENT: 1.9 %
NEUTROPHILS ABSOLUTE: 5.5 K/UL (ref 1.4–6.5)
NEUTROPHILS RELATIVE PERCENT: 92 %
NITRITE, URINE: POSITIVE
NUCLEATED RED BLOOD CELLS: 1 /100 WBC
OPIATE SCREEN URINE: NORMAL
OSMOLALITY URINE: 289 MOSM/KG (ref 300–900)
OXYCODONE URINE: NORMAL
PAPPENHEIMER BODIES: ABNORMAL
PDW BLD-RTO: 19.2 % (ref 11.5–14.5)
PERFORMED ON: NORMAL
PH UA: 5.5 (ref 5–9)
PHENCYCLIDINE SCREEN URINE: NORMAL
PLATELET # BLD: 215 K/UL (ref 130–400)
PLATELET SLIDE REVIEW: NORMAL
POIKILOCYTES: ABNORMAL
POLYCHROMASIA: ABNORMAL
POTASSIUM REFLEX MAGNESIUM: 4.6 MEQ/L (ref 3.4–4.9)
PROCALCITONIN: 3.89 NG/ML (ref 0–0.15)
PROTEIN UA: 100 MG/DL
PROTHROMBIN TIME: 18.9 SEC (ref 12.3–14.9)
RBC # BLD: 2.47 M/UL (ref 4.2–5.4)
RBC UA: ABNORMAL /HPF (ref 0–2)
SEDIMENTATION RATE, ERYTHROCYTE: 12 MM (ref 0–30)
SODIUM BLD-SCNC: 125 MEQ/L (ref 135–144)
SPECIFIC GRAVITY UA: 1.02 (ref 1–1.03)
STOMATOCYTES: ABNORMAL
TARGET CELLS: ABNORMAL
TOTAL PROTEIN: 5.4 G/DL (ref 6.3–8)
URIC ACID, SERUM: 7.4 MG/DL (ref 2.4–5.7)
URINE REFLEX TO CULTURE: YES
UROBILINOGEN, URINE: 1 E.U./DL
WBC # BLD: 5.7 K/UL (ref 4.8–10.8)
WBC UA: >100 /HPF (ref 0–5)

## 2019-09-09 PROCEDURE — 99212 OFFICE O/P EST SF 10 MIN: CPT

## 2019-09-09 PROCEDURE — 2580000003 HC RX 258: Performed by: INTERNAL MEDICINE

## 2019-09-09 PROCEDURE — 87040 BLOOD CULTURE FOR BACTERIA: CPT

## 2019-09-09 PROCEDURE — 84145 PROCALCITONIN (PCT): CPT

## 2019-09-09 PROCEDURE — 2580000003 HC RX 258: Performed by: NURSE PRACTITIONER

## 2019-09-09 PROCEDURE — 6360000002 HC RX W HCPCS: Performed by: INTERNAL MEDICINE

## 2019-09-09 PROCEDURE — 80076 HEPATIC FUNCTION PANEL: CPT

## 2019-09-09 PROCEDURE — 85025 COMPLETE CBC W/AUTO DIFF WBC: CPT

## 2019-09-09 PROCEDURE — 73630 X-RAY EXAM OF FOOT: CPT

## 2019-09-09 PROCEDURE — 82977 ASSAY OF GGT: CPT

## 2019-09-09 PROCEDURE — 6370000000 HC RX 637 (ALT 250 FOR IP): Performed by: INTERNAL MEDICINE

## 2019-09-09 PROCEDURE — 93010 ELECTROCARDIOGRAM REPORT: CPT | Performed by: INTERNAL MEDICINE

## 2019-09-09 PROCEDURE — 93926 LOWER EXTREMITY STUDY: CPT

## 2019-09-09 PROCEDURE — 82140 ASSAY OF AMMONIA: CPT

## 2019-09-09 PROCEDURE — 84550 ASSAY OF BLOOD/URIC ACID: CPT

## 2019-09-09 PROCEDURE — 85652 RBC SED RATE AUTOMATED: CPT

## 2019-09-09 PROCEDURE — 85730 THROMBOPLASTIN TIME PARTIAL: CPT

## 2019-09-09 PROCEDURE — 6370000000 HC RX 637 (ALT 250 FOR IP): Performed by: SPECIALIST

## 2019-09-09 PROCEDURE — 36415 COLL VENOUS BLD VENIPUNCTURE: CPT

## 2019-09-09 PROCEDURE — 2500000003 HC RX 250 WO HCPCS: Performed by: INTERNAL MEDICINE

## 2019-09-09 PROCEDURE — 83735 ASSAY OF MAGNESIUM: CPT

## 2019-09-09 PROCEDURE — 76705 ECHO EXAM OF ABDOMEN: CPT

## 2019-09-09 PROCEDURE — 86141 C-REACTIVE PROTEIN HS: CPT

## 2019-09-09 PROCEDURE — 82330 ASSAY OF CALCIUM: CPT

## 2019-09-09 PROCEDURE — 93005 ELECTROCARDIOGRAM TRACING: CPT | Performed by: NURSE PRACTITIONER

## 2019-09-09 PROCEDURE — 1210000000 HC MED SURG R&B

## 2019-09-09 PROCEDURE — 99222 1ST HOSP IP/OBS MODERATE 55: CPT | Performed by: SPECIALIST

## 2019-09-09 PROCEDURE — 6370000000 HC RX 637 (ALT 250 FOR IP): Performed by: NURSE PRACTITIONER

## 2019-09-09 PROCEDURE — 80048 BASIC METABOLIC PNL TOTAL CA: CPT

## 2019-09-09 PROCEDURE — 85610 PROTHROMBIN TIME: CPT

## 2019-09-09 PROCEDURE — 6360000002 HC RX W HCPCS: Performed by: NURSE PRACTITIONER

## 2019-09-09 RX ORDER — LACTULOSE 10 G/15ML
20 SOLUTION ORAL 3 TIMES DAILY
Status: DISCONTINUED | OUTPATIENT
Start: 2019-09-09 | End: 2019-09-12

## 2019-09-09 RX ORDER — TRIAMCINOLONE ACETONIDE 1 MG/G
CREAM TOPICAL 2 TIMES DAILY
Status: DISCONTINUED | OUTPATIENT
Start: 2019-09-09 | End: 2019-09-13 | Stop reason: HOSPADM

## 2019-09-09 RX ADMIN — ENOXAPARIN SODIUM 40 MG: 40 INJECTION SUBCUTANEOUS at 09:25

## 2019-09-09 RX ADMIN — ACETAMINOPHEN 650 MG: 325 TABLET ORAL at 23:27

## 2019-09-09 RX ADMIN — TRIAMCINOLONE ACETONIDE: 1 CREAM TOPICAL at 14:40

## 2019-09-09 RX ADMIN — LACTULOSE 20 G: 20 SOLUTION ORAL at 14:35

## 2019-09-09 RX ADMIN — Medication 100 MG: at 09:24

## 2019-09-09 RX ADMIN — RIFAXIMIN 550 MG: 550 TABLET ORAL at 20:09

## 2019-09-09 RX ADMIN — ACETAMINOPHEN 650 MG: 325 TABLET ORAL at 12:05

## 2019-09-09 RX ADMIN — FOLIC ACID 1 MG: 1 TABLET ORAL at 09:25

## 2019-09-09 RX ADMIN — ACETAMINOPHEN 650 MG: 325 TABLET ORAL at 06:15

## 2019-09-09 RX ADMIN — SODIUM CHLORIDE: 9 INJECTION, SOLUTION INTRAVENOUS at 09:17

## 2019-09-09 RX ADMIN — FAMOTIDINE 20 MG: 20 TABLET ORAL at 20:09

## 2019-09-09 RX ADMIN — CEFTRIAXONE SODIUM 1 G: 1 INJECTION, POWDER, FOR SOLUTION INTRAMUSCULAR; INTRAVENOUS at 20:09

## 2019-09-09 RX ADMIN — TRIAMCINOLONE ACETONIDE: 1 CREAM TOPICAL at 22:34

## 2019-09-09 RX ADMIN — Medication: at 22:35

## 2019-09-09 RX ADMIN — LACTULOSE 20 G: 20 SOLUTION ORAL at 20:09

## 2019-09-09 RX ADMIN — SODIUM CHLORIDE: 9 INJECTION, SOLUTION INTRAVENOUS at 19:38

## 2019-09-09 RX ADMIN — Medication: at 14:39

## 2019-09-09 RX ADMIN — SODIUM CHLORIDE: 9 INJECTION, SOLUTION INTRAVENOUS at 06:15

## 2019-09-09 RX ADMIN — Medication 10 ML: at 22:37

## 2019-09-09 RX ADMIN — FAMOTIDINE 20 MG: 20 TABLET ORAL at 09:25

## 2019-09-09 ASSESSMENT — ENCOUNTER SYMPTOMS
VOMITING: 0
NAUSEA: 0
COUGH: 0
DIARRHEA: 0
SHORTNESS OF BREATH: 0

## 2019-09-09 ASSESSMENT — PAIN SCALES - GENERAL
PAINLEVEL_OUTOF10: 7

## 2019-09-10 ENCOUNTER — APPOINTMENT (OUTPATIENT)
Dept: ULTRASOUND IMAGING | Age: 64
DRG: 426 | End: 2019-09-10
Payer: MEDICAID

## 2019-09-10 ENCOUNTER — APPOINTMENT (OUTPATIENT)
Dept: GENERAL RADIOLOGY | Age: 64
DRG: 426 | End: 2019-09-10
Payer: MEDICAID

## 2019-09-10 LAB
ALBUMIN SERPL-MCNC: 2 G/DL (ref 3.5–4.6)
ALP BLD-CCNC: 361 U/L (ref 40–130)
ALT SERPL-CCNC: 89 U/L (ref 0–33)
AMMONIA: 73 UMOL/L (ref 11–51)
ANION GAP SERPL CALCULATED.3IONS-SCNC: 12 MEQ/L (ref 9–15)
ANISOCYTOSIS: ABNORMAL
AST SERPL-CCNC: 78 U/L (ref 0–35)
BASOPHILS ABSOLUTE: 0.1 K/UL (ref 0–0.2)
BASOPHILS RELATIVE PERCENT: 0.8 %
BILIRUB SERPL-MCNC: 4.4 MG/DL (ref 0.2–0.7)
BUN BLDV-MCNC: 14 MG/DL (ref 8–23)
CALCIUM IONIZED, CALC AT PH 7.4: 1.01 MMOL/L (ref 1.11–1.3)
CALCIUM IONIZED: 0.99 MMOL/L (ref 1.11–1.3)
CALCIUM SERPL-MCNC: 7.1 MG/DL (ref 8.5–9.9)
CHLORIDE BLD-SCNC: 92 MEQ/L (ref 95–107)
CO2: 21 MEQ/L (ref 20–31)
CREAT SERPL-MCNC: 1.65 MG/DL (ref 0.5–0.9)
EOSINOPHILS ABSOLUTE: 0.4 K/UL (ref 0–0.7)
EOSINOPHILS RELATIVE PERCENT: 6.7 %
GFR AFRICAN AMERICAN: 38
GFR NON-AFRICAN AMERICAN: 31.4
GLOBULIN: 3.3 G/DL (ref 2.3–3.5)
GLUCOSE BLD-MCNC: 103 MG/DL (ref 60–115)
GLUCOSE BLD-MCNC: 103 MG/DL (ref 70–99)
GLUCOSE BLD-MCNC: 107 MG/DL (ref 60–115)
GLUCOSE BLD-MCNC: 95 MG/DL (ref 60–115)
HCT VFR BLD CALC: 24.4 % (ref 37–47)
HEMOGLOBIN: 8.5 G/DL (ref 12–16)
HYPOCHROMIA: ABNORMAL
LYMPHOCYTES ABSOLUTE: 1.4 K/UL (ref 1–4.8)
LYMPHOCYTES RELATIVE PERCENT: 21.6 %
MACROCYTES: ABNORMAL
MCH RBC QN AUTO: 36.9 PG (ref 27–31.3)
MCHC RBC AUTO-ENTMCNC: 34.8 % (ref 33–37)
MCV RBC AUTO: 106.2 FL (ref 82–100)
MONOCYTES ABSOLUTE: 0.2 K/UL (ref 0.2–0.8)
MONOCYTES RELATIVE PERCENT: 3.7 %
NEUTROPHILS ABSOLUTE: 4.5 K/UL (ref 1.4–6.5)
NEUTROPHILS RELATIVE PERCENT: 67.2 %
PDW BLD-RTO: 19.4 % (ref 11.5–14.5)
PERFORMED ON: NORMAL
PLATELET # BLD: 210 K/UL (ref 130–400)
POIKILOCYTES: ABNORMAL
POTASSIUM SERPL-SCNC: 3.9 MEQ/L (ref 3.4–4.9)
RBC # BLD: 2.29 M/UL (ref 4.2–5.4)
SODIUM BLD-SCNC: 125 MEQ/L (ref 135–144)
STOMATOCYTES: ABNORMAL
TARGET CELLS: ABNORMAL
TOTAL PROTEIN: 5.3 G/DL (ref 6.3–8)
WBC # BLD: 6.7 K/UL (ref 4.8–10.8)

## 2019-09-10 PROCEDURE — 6360000002 HC RX W HCPCS: Performed by: INTERNAL MEDICINE

## 2019-09-10 PROCEDURE — 85025 COMPLETE CBC W/AUTO DIFF WBC: CPT

## 2019-09-10 PROCEDURE — 76775 US EXAM ABDO BACK WALL LIM: CPT

## 2019-09-10 PROCEDURE — 94640 AIRWAY INHALATION TREATMENT: CPT

## 2019-09-10 PROCEDURE — 2580000003 HC RX 258: Performed by: NURSE PRACTITIONER

## 2019-09-10 PROCEDURE — 97163 PT EVAL HIGH COMPLEX 45 MIN: CPT

## 2019-09-10 PROCEDURE — 71045 X-RAY EXAM CHEST 1 VIEW: CPT

## 2019-09-10 PROCEDURE — 94664 DEMO&/EVAL PT USE INHALER: CPT

## 2019-09-10 PROCEDURE — 36415 COLL VENOUS BLD VENIPUNCTURE: CPT

## 2019-09-10 PROCEDURE — P9047 ALBUMIN (HUMAN), 25%, 50ML: HCPCS | Performed by: STUDENT IN AN ORGANIZED HEALTH CARE EDUCATION/TRAINING PROGRAM

## 2019-09-10 PROCEDURE — 2580000003 HC RX 258: Performed by: INTERNAL MEDICINE

## 2019-09-10 PROCEDURE — 93010 ELECTROCARDIOGRAM REPORT: CPT | Performed by: INTERNAL MEDICINE

## 2019-09-10 PROCEDURE — 82140 ASSAY OF AMMONIA: CPT

## 2019-09-10 PROCEDURE — 6370000000 HC RX 637 (ALT 250 FOR IP): Performed by: NURSE PRACTITIONER

## 2019-09-10 PROCEDURE — 1210000000 HC MED SURG R&B

## 2019-09-10 PROCEDURE — 6360000002 HC RX W HCPCS: Performed by: STUDENT IN AN ORGANIZED HEALTH CARE EDUCATION/TRAINING PROGRAM

## 2019-09-10 PROCEDURE — 6370000000 HC RX 637 (ALT 250 FOR IP): Performed by: INTERNAL MEDICINE

## 2019-09-10 PROCEDURE — 6360000002 HC RX W HCPCS: Performed by: NURSE PRACTITIONER

## 2019-09-10 PROCEDURE — 97166 OT EVAL MOD COMPLEX 45 MIN: CPT

## 2019-09-10 PROCEDURE — 80053 COMPREHEN METABOLIC PANEL: CPT

## 2019-09-10 PROCEDURE — 6370000000 HC RX 637 (ALT 250 FOR IP): Performed by: SPECIALIST

## 2019-09-10 RX ORDER — ALBUTEROL SULFATE 90 UG/1
2 AEROSOL, METERED RESPIRATORY (INHALATION) EVERY 6 HOURS PRN
Status: DISCONTINUED | OUTPATIENT
Start: 2019-09-10 | End: 2019-09-10

## 2019-09-10 RX ORDER — ALBUTEROL SULFATE 2.5 MG/3ML
2.5 SOLUTION RESPIRATORY (INHALATION) ONCE
Status: DISCONTINUED | OUTPATIENT
Start: 2019-09-10 | End: 2019-09-10

## 2019-09-10 RX ORDER — ALBUMIN (HUMAN) 12.5 G/50ML
25 SOLUTION INTRAVENOUS ONCE
Status: COMPLETED | OUTPATIENT
Start: 2019-09-10 | End: 2019-09-10

## 2019-09-10 RX ORDER — ALBUTEROL SULFATE 90 UG/1
2 AEROSOL, METERED RESPIRATORY (INHALATION) EVERY 4 HOURS PRN
Status: DISCONTINUED | OUTPATIENT
Start: 2019-09-10 | End: 2019-09-13 | Stop reason: HOSPADM

## 2019-09-10 RX ADMIN — Medication: at 20:46

## 2019-09-10 RX ADMIN — Medication: at 14:33

## 2019-09-10 RX ADMIN — SODIUM CHLORIDE: 9 INJECTION, SOLUTION INTRAVENOUS at 05:48

## 2019-09-10 RX ADMIN — RIFAXIMIN 550 MG: 550 TABLET ORAL at 20:46

## 2019-09-10 RX ADMIN — LACTULOSE 20 G: 20 SOLUTION ORAL at 20:46

## 2019-09-10 RX ADMIN — FOLIC ACID 1 MG: 1 TABLET ORAL at 08:23

## 2019-09-10 RX ADMIN — FAMOTIDINE 20 MG: 20 TABLET ORAL at 20:46

## 2019-09-10 RX ADMIN — FAMOTIDINE 20 MG: 20 TABLET ORAL at 08:23

## 2019-09-10 RX ADMIN — LACTULOSE 20 G: 20 SOLUTION ORAL at 14:30

## 2019-09-10 RX ADMIN — TRIAMCINOLONE ACETONIDE: 1 CREAM TOPICAL at 20:46

## 2019-09-10 RX ADMIN — Medication 100 MG: at 08:23

## 2019-09-10 RX ADMIN — ALBUTEROL SULFATE 2 PUFF: 90 AEROSOL, METERED RESPIRATORY (INHALATION) at 08:22

## 2019-09-10 RX ADMIN — Medication 10 ML: at 20:47

## 2019-09-10 RX ADMIN — RIFAXIMIN 550 MG: 550 TABLET ORAL at 08:23

## 2019-09-10 RX ADMIN — ALBUMIN (HUMAN) 25 G: 0.25 INJECTION, SOLUTION INTRAVENOUS at 14:30

## 2019-09-10 RX ADMIN — ENOXAPARIN SODIUM 40 MG: 40 INJECTION SUBCUTANEOUS at 08:24

## 2019-09-10 RX ADMIN — CEFTRIAXONE SODIUM 1 G: 1 INJECTION, POWDER, FOR SOLUTION INTRAMUSCULAR; INTRAVENOUS at 20:46

## 2019-09-10 RX ADMIN — LACTULOSE 20 G: 20 SOLUTION ORAL at 08:22

## 2019-09-10 RX ADMIN — LORAZEPAM 2 MG: 2 INJECTION INTRAMUSCULAR; INTRAVENOUS at 20:46

## 2019-09-10 ASSESSMENT — ENCOUNTER SYMPTOMS
VOMITING: 0
DIARRHEA: 0
COUGH: 0
SHORTNESS OF BREATH: 0
NAUSEA: 0

## 2019-09-10 ASSESSMENT — PAIN DESCRIPTION - ORIENTATION: ORIENTATION: MID

## 2019-09-10 ASSESSMENT — PAIN SCALES - GENERAL
PAINLEVEL_OUTOF10: 4
PAINLEVEL_OUTOF10: 7

## 2019-09-10 ASSESSMENT — PAIN DESCRIPTION - PROGRESSION
CLINICAL_PROGRESSION: NOT CHANGED

## 2019-09-10 ASSESSMENT — PAIN DESCRIPTION - PAIN TYPE: TYPE: CHRONIC PAIN

## 2019-09-10 ASSESSMENT — PAIN DESCRIPTION - ONSET: ONSET: GRADUAL

## 2019-09-10 ASSESSMENT — PAIN DESCRIPTION - DESCRIPTORS: DESCRIPTORS: ACHING

## 2019-09-10 ASSESSMENT — PAIN DESCRIPTION - FREQUENCY: FREQUENCY: CONTINUOUS

## 2019-09-10 ASSESSMENT — PAIN - FUNCTIONAL ASSESSMENT: PAIN_FUNCTIONAL_ASSESSMENT: PREVENTS OR INTERFERES WITH MANY ACTIVE NOT PASSIVE ACTIVITIES

## 2019-09-10 ASSESSMENT — PAIN DESCRIPTION - LOCATION: LOCATION: ABDOMEN

## 2019-09-11 LAB
ALBUMIN SERPL-MCNC: 2.1 G/DL (ref 3.5–4.6)
ALP BLD-CCNC: 323 U/L (ref 40–130)
ALT SERPL-CCNC: 83 U/L (ref 0–33)
AMMONIA: 83 UMOL/L (ref 11–51)
ANION GAP SERPL CALCULATED.3IONS-SCNC: 17 MEQ/L (ref 9–15)
AST SERPL-CCNC: 63 U/L (ref 0–35)
BASE EXCESS ARTERIAL: -1 (ref -3–3)
BILIRUB SERPL-MCNC: 3.9 MG/DL (ref 0.2–0.7)
BUN BLDV-MCNC: 14 MG/DL (ref 8–23)
CALCIUM SERPL-MCNC: 7.3 MG/DL (ref 8.5–9.9)
CHLORIDE BLD-SCNC: 93 MEQ/L (ref 95–107)
CO2: 17 MEQ/L (ref 20–31)
CREAT SERPL-MCNC: 1.44 MG/DL (ref 0.5–0.9)
FERRITIN: 1915 NG/ML (ref 13–150)
GFR AFRICAN AMERICAN: 44.5
GFR NON-AFRICAN AMERICAN: 36.7
GLOBULIN: 3.1 G/DL (ref 2.3–3.5)
GLUCOSE BLD-MCNC: 96 MG/DL (ref 60–115)
GLUCOSE BLD-MCNC: 98 MG/DL (ref 70–99)
HCO3 ARTERIAL: 22.4 MMOL/L (ref 21–29)
IRON SATURATION: 122 % (ref 11–46)
IRON: 67 UG/DL (ref 37–145)
LACTATE: 1.01 MMOL/L (ref 0.4–2)
O2 SAT, ARTERIAL: 97 % (ref 93–100)
PCO2 ARTERIAL: 31 MM HG (ref 35–45)
PERFORMED ON: ABNORMAL
PERFORMED ON: NORMAL
PH ARTERIAL: 7.47 (ref 7.35–7.45)
PO2 ARTERIAL: 79 MM HG (ref 75–108)
POC SAMPLE TYPE: ABNORMAL
POTASSIUM SERPL-SCNC: 3.8 MEQ/L (ref 3.4–4.9)
SODIUM BLD-SCNC: 127 MEQ/L (ref 135–144)
TCO2 ARTERIAL: 23 (ref 22–29)
TOTAL IRON BINDING CAPACITY: 55 UG/DL (ref 178–450)
TOTAL PROTEIN: 5.2 G/DL (ref 6.3–8)

## 2019-09-11 PROCEDURE — 80053 COMPREHEN METABOLIC PANEL: CPT

## 2019-09-11 PROCEDURE — 83540 ASSAY OF IRON: CPT

## 2019-09-11 PROCEDURE — 82728 ASSAY OF FERRITIN: CPT

## 2019-09-11 PROCEDURE — 6370000000 HC RX 637 (ALT 250 FOR IP): Performed by: NURSE PRACTITIONER

## 2019-09-11 PROCEDURE — 82803 BLOOD GASES ANY COMBINATION: CPT

## 2019-09-11 PROCEDURE — 6360000002 HC RX W HCPCS: Performed by: NURSE PRACTITIONER

## 2019-09-11 PROCEDURE — 2580000003 HC RX 258: Performed by: NURSE PRACTITIONER

## 2019-09-11 PROCEDURE — 83550 IRON BINDING TEST: CPT

## 2019-09-11 PROCEDURE — APPNB45 APP NON BILLABLE 31-45 MINUTES: Performed by: PHYSICIAN ASSISTANT

## 2019-09-11 PROCEDURE — 82140 ASSAY OF AMMONIA: CPT

## 2019-09-11 PROCEDURE — 83605 ASSAY OF LACTIC ACID: CPT

## 2019-09-11 PROCEDURE — 82948 REAGENT STRIP/BLOOD GLUCOSE: CPT

## 2019-09-11 PROCEDURE — 1210000000 HC MED SURG R&B

## 2019-09-11 PROCEDURE — 36415 COLL VENOUS BLD VENIPUNCTURE: CPT

## 2019-09-11 PROCEDURE — 2580000003 HC RX 258: Performed by: INTERNAL MEDICINE

## 2019-09-11 PROCEDURE — 6370000000 HC RX 637 (ALT 250 FOR IP): Performed by: INTERNAL MEDICINE

## 2019-09-11 PROCEDURE — 6360000002 HC RX W HCPCS: Performed by: INTERNAL MEDICINE

## 2019-09-11 PROCEDURE — 6370000000 HC RX 637 (ALT 250 FOR IP): Performed by: SPECIALIST

## 2019-09-11 RX ADMIN — ENOXAPARIN SODIUM 40 MG: 40 INJECTION SUBCUTANEOUS at 09:16

## 2019-09-11 RX ADMIN — Medication 10 ML: at 22:28

## 2019-09-11 RX ADMIN — LACTULOSE 20 G: 20 SOLUTION ORAL at 09:16

## 2019-09-11 RX ADMIN — TRIAMCINOLONE ACETONIDE: 1 CREAM TOPICAL at 09:17

## 2019-09-11 RX ADMIN — FOLIC ACID 1 MG: 1 TABLET ORAL at 09:16

## 2019-09-11 RX ADMIN — Medication 10 ML: at 09:17

## 2019-09-11 RX ADMIN — FAMOTIDINE 20 MG: 20 TABLET ORAL at 09:16

## 2019-09-11 RX ADMIN — ACETAMINOPHEN 650 MG: 325 TABLET ORAL at 11:42

## 2019-09-11 RX ADMIN — CEFTRIAXONE SODIUM 1 G: 1 INJECTION, POWDER, FOR SOLUTION INTRAMUSCULAR; INTRAVENOUS at 20:33

## 2019-09-11 RX ADMIN — LORAZEPAM 2 MG: 1 TABLET ORAL at 11:42

## 2019-09-11 RX ADMIN — Medication 100 MG: at 09:16

## 2019-09-11 RX ADMIN — TRIAMCINOLONE ACETONIDE: 1 CREAM TOPICAL at 22:31

## 2019-09-11 RX ADMIN — RIFAXIMIN 550 MG: 550 TABLET ORAL at 09:16

## 2019-09-11 RX ADMIN — LACTULOSE 20 G: 20 SOLUTION ORAL at 13:55

## 2019-09-11 RX ADMIN — Medication: at 13:56

## 2019-09-11 RX ADMIN — Medication: at 22:28

## 2019-09-11 ASSESSMENT — ENCOUNTER SYMPTOMS
VOMITING: 0
DIARRHEA: 0
SHORTNESS OF BREATH: 0
COUGH: 0
NAUSEA: 0

## 2019-09-11 ASSESSMENT — PAIN SCALES - GENERAL
PAINLEVEL_OUTOF10: 4
PAINLEVEL_OUTOF10: 0

## 2019-09-12 VITALS
BODY MASS INDEX: 28.93 KG/M2 | HEART RATE: 93 BPM | SYSTOLIC BLOOD PRESSURE: 103 MMHG | OXYGEN SATURATION: 99 % | DIASTOLIC BLOOD PRESSURE: 53 MMHG | TEMPERATURE: 97.2 F | RESPIRATION RATE: 16 BRPM | HEIGHT: 66 IN | WEIGHT: 180 LBS

## 2019-09-12 LAB
ALBUMIN SERPL-MCNC: 2.1 G/DL (ref 3.5–4.6)
ALP BLD-CCNC: 354 U/L (ref 40–130)
ALT SERPL-CCNC: 89 U/L (ref 0–33)
AMMONIA: 113 UMOL/L (ref 11–51)
ANION GAP SERPL CALCULATED.3IONS-SCNC: 12 MEQ/L (ref 9–15)
AST SERPL-CCNC: 58 U/L (ref 0–35)
BILIRUB SERPL-MCNC: 4.2 MG/DL (ref 0.2–0.7)
BUN BLDV-MCNC: 14 MG/DL (ref 8–23)
CALCIUM SERPL-MCNC: 7.6 MG/DL (ref 8.5–9.9)
CHLORIDE BLD-SCNC: 97 MEQ/L (ref 95–107)
CO2: 21 MEQ/L (ref 20–31)
CREAT SERPL-MCNC: 0.97 MG/DL (ref 0.5–0.9)
GFR AFRICAN AMERICAN: >60
GFR NON-AFRICAN AMERICAN: 58
GLOBULIN: 3.2 G/DL (ref 2.3–3.5)
GLUCOSE BLD-MCNC: 73 MG/DL (ref 70–99)
ORGANISM: ABNORMAL
POTASSIUM SERPL-SCNC: 3.6 MEQ/L (ref 3.4–4.9)
SODIUM BLD-SCNC: 130 MEQ/L (ref 135–144)
TOTAL PROTEIN: 5.3 G/DL (ref 6.3–8)
URINE CULTURE, ROUTINE: ABNORMAL

## 2019-09-12 PROCEDURE — 82140 ASSAY OF AMMONIA: CPT

## 2019-09-12 PROCEDURE — 80053 COMPREHEN METABOLIC PANEL: CPT

## 2019-09-12 PROCEDURE — 1210000000 HC MED SURG R&B

## 2019-09-12 PROCEDURE — 2580000003 HC RX 258: Performed by: NURSE PRACTITIONER

## 2019-09-12 PROCEDURE — 36415 COLL VENOUS BLD VENIPUNCTURE: CPT

## 2019-09-12 PROCEDURE — 6370000000 HC RX 637 (ALT 250 FOR IP): Performed by: INTERNAL MEDICINE

## 2019-09-12 PROCEDURE — 6360000002 HC RX W HCPCS: Performed by: NURSE PRACTITIONER

## 2019-09-12 PROCEDURE — 6370000000 HC RX 637 (ALT 250 FOR IP): Performed by: SPECIALIST

## 2019-09-12 PROCEDURE — 6370000000 HC RX 637 (ALT 250 FOR IP): Performed by: NURSE PRACTITIONER

## 2019-09-12 RX ORDER — CIPROFLOXACIN 500 MG/1
500 TABLET, FILM COATED ORAL EVERY 12 HOURS SCHEDULED
Status: DISCONTINUED | OUTPATIENT
Start: 2019-09-12 | End: 2019-09-13 | Stop reason: HOSPADM

## 2019-09-12 RX ORDER — TRIAMCINOLONE ACETONIDE 1 MG/G
CREAM TOPICAL
Qty: 1 TUBE | Refills: 0 | Status: SHIPPED | OUTPATIENT
Start: 2019-09-12

## 2019-09-12 RX ORDER — CIPROFLOXACIN 500 MG/1
500 TABLET, FILM COATED ORAL EVERY 12 HOURS SCHEDULED
Qty: 20 TABLET | Refills: 0 | Status: SHIPPED | OUTPATIENT
Start: 2019-09-12 | End: 2019-09-22

## 2019-09-12 RX ORDER — LACTULOSE 10 G/15ML
30 SOLUTION ORAL 3 TIMES DAILY
Qty: 1 BOTTLE | Refills: 1 | Status: SHIPPED | OUTPATIENT
Start: 2019-09-12

## 2019-09-12 RX ORDER — LACTULOSE 10 G/15ML
30 SOLUTION ORAL 3 TIMES DAILY
Status: DISCONTINUED | OUTPATIENT
Start: 2019-09-12 | End: 2019-09-13 | Stop reason: HOSPADM

## 2019-09-12 RX ADMIN — Medication: at 14:30

## 2019-09-12 RX ADMIN — Medication 10 ML: at 20:46

## 2019-09-12 RX ADMIN — LACTULOSE 30 G: 20 SOLUTION ORAL at 20:44

## 2019-09-12 RX ADMIN — Medication 10 ML: at 09:04

## 2019-09-12 RX ADMIN — RIFAXIMIN 550 MG: 550 TABLET ORAL at 20:44

## 2019-09-12 RX ADMIN — CIPROFLOXACIN 500 MG: 500 TABLET, FILM COATED ORAL at 20:45

## 2019-09-12 RX ADMIN — LACTULOSE 30 G: 20 SOLUTION ORAL at 14:30

## 2019-09-12 RX ADMIN — RIFAXIMIN 550 MG: 550 TABLET ORAL at 08:58

## 2019-09-12 RX ADMIN — Medication: at 20:45

## 2019-09-12 RX ADMIN — Medication 100 MG: at 08:58

## 2019-09-12 RX ADMIN — LACTULOSE 20 G: 20 SOLUTION ORAL at 08:58

## 2019-09-12 RX ADMIN — FAMOTIDINE 20 MG: 20 TABLET ORAL at 08:58

## 2019-09-12 RX ADMIN — ENOXAPARIN SODIUM 40 MG: 40 INJECTION SUBCUTANEOUS at 08:58

## 2019-09-12 RX ADMIN — TRIAMCINOLONE ACETONIDE: 1 CREAM TOPICAL at 20:45

## 2019-09-12 RX ADMIN — TRIAMCINOLONE ACETONIDE: 1 CREAM TOPICAL at 09:04

## 2019-09-12 RX ADMIN — FOLIC ACID 1 MG: 1 TABLET ORAL at 08:58

## 2019-09-12 RX ADMIN — FAMOTIDINE 20 MG: 20 TABLET ORAL at 20:45

## 2019-09-12 RX ADMIN — Medication: at 06:38

## 2019-09-12 ASSESSMENT — PAIN SCALES - GENERAL
PAINLEVEL_OUTOF10: 0

## 2019-09-13 LAB
ALBUMIN SERPL-MCNC: 2.2 G/DL (ref 3.5–4.6)
ALP BLD-CCNC: 342 U/L (ref 40–130)
ALT SERPL-CCNC: 94 U/L (ref 0–33)
AMMONIA: 62 UMOL/L (ref 11–51)
ANION GAP SERPL CALCULATED.3IONS-SCNC: 12 MEQ/L (ref 9–15)
AST SERPL-CCNC: 59 U/L (ref 0–35)
BILIRUB SERPL-MCNC: 4.2 MG/DL (ref 0.2–0.7)
BLOOD CULTURE, ROUTINE: NORMAL
BUN BLDV-MCNC: 14 MG/DL (ref 8–23)
CALCIUM SERPL-MCNC: 7.6 MG/DL (ref 8.5–9.9)
CHLORIDE BLD-SCNC: 100 MEQ/L (ref 95–107)
CO2: 20 MEQ/L (ref 20–31)
CREAT SERPL-MCNC: 0.7 MG/DL (ref 0.5–0.9)
CULTURE, BLOOD 2: NORMAL
FOLATE: 10.1 NG/ML (ref 7.3–26.1)
GFR AFRICAN AMERICAN: >60
GFR NON-AFRICAN AMERICAN: >60
GLOBULIN: 2.9 G/DL (ref 2.3–3.5)
GLUCOSE BLD-MCNC: 129 MG/DL (ref 60–115)
GLUCOSE BLD-MCNC: 99 MG/DL (ref 70–99)
PERFORMED ON: ABNORMAL
POTASSIUM SERPL-SCNC: 3.4 MEQ/L (ref 3.4–4.9)
SODIUM BLD-SCNC: 132 MEQ/L (ref 135–144)
TOTAL PROTEIN: 5.1 G/DL (ref 6.3–8)
VITAMIN D 25-HYDROXY: 21.3 NG/ML (ref 30–100)

## 2019-09-13 PROCEDURE — 80053 COMPREHEN METABOLIC PANEL: CPT

## 2019-09-13 PROCEDURE — 82140 ASSAY OF AMMONIA: CPT

## 2019-09-13 PROCEDURE — 97535 SELF CARE MNGMENT TRAINING: CPT

## 2019-09-13 PROCEDURE — 82746 ASSAY OF FOLIC ACID SERUM: CPT

## 2019-09-13 PROCEDURE — 6360000002 HC RX W HCPCS: Performed by: NURSE PRACTITIONER

## 2019-09-13 PROCEDURE — 6370000000 HC RX 637 (ALT 250 FOR IP): Performed by: SPECIALIST

## 2019-09-13 PROCEDURE — 82306 VITAMIN D 25 HYDROXY: CPT

## 2019-09-13 PROCEDURE — 36415 COLL VENOUS BLD VENIPUNCTURE: CPT

## 2019-09-13 PROCEDURE — 6370000000 HC RX 637 (ALT 250 FOR IP): Performed by: INTERNAL MEDICINE

## 2019-09-13 PROCEDURE — 97112 NEUROMUSCULAR REEDUCATION: CPT

## 2019-09-13 PROCEDURE — 6370000000 HC RX 637 (ALT 250 FOR IP): Performed by: NURSE PRACTITIONER

## 2019-09-13 RX ADMIN — ENOXAPARIN SODIUM 40 MG: 40 INJECTION SUBCUTANEOUS at 07:46

## 2019-09-13 RX ADMIN — RIFAXIMIN 550 MG: 550 TABLET ORAL at 07:43

## 2019-09-13 RX ADMIN — FOLIC ACID 1 MG: 1 TABLET ORAL at 07:43

## 2019-09-13 RX ADMIN — FAMOTIDINE 20 MG: 20 TABLET ORAL at 07:43

## 2019-09-13 RX ADMIN — Medication 100 MG: at 07:43

## 2019-09-13 RX ADMIN — Medication: at 06:43

## 2019-09-13 RX ADMIN — LACTULOSE 30 G: 20 SOLUTION ORAL at 07:43

## 2019-09-13 RX ADMIN — TRIAMCINOLONE ACETONIDE: 1 CREAM TOPICAL at 10:39

## 2019-09-13 RX ADMIN — CIPROFLOXACIN 500 MG: 500 TABLET, FILM COATED ORAL at 07:43

## 2019-09-13 ASSESSMENT — ENCOUNTER SYMPTOMS
COUGH: 0
VOMITING: 0
SHORTNESS OF BREATH: 0
NAUSEA: 0
DIARRHEA: 0

## 2019-09-13 ASSESSMENT — PAIN SCALES - GENERAL: PAINLEVEL_OUTOF10: 0

## 2019-09-15 LAB
BLOOD CULTURE, ROUTINE: NORMAL
CULTURE, BLOOD 2: NORMAL

## 2019-10-01 ENCOUNTER — HOSPITAL ENCOUNTER (OUTPATIENT)
Age: 64
Setting detail: OBSERVATION
Discharge: HOSPICE | End: 2019-10-02
Attending: INTERNAL MEDICINE | Admitting: INTERNAL MEDICINE
Payer: MEDICAID

## 2019-10-01 ENCOUNTER — APPOINTMENT (OUTPATIENT)
Dept: GENERAL RADIOLOGY | Age: 64
End: 2019-10-01
Payer: MEDICAID

## 2019-10-01 DIAGNOSIS — N17.9 AKI (ACUTE KIDNEY INJURY) (HCC): ICD-10-CM

## 2019-10-01 DIAGNOSIS — D64.9 ANEMIA, UNSPECIFIED TYPE: Primary | ICD-10-CM

## 2019-10-01 DIAGNOSIS — T68.XXXA HYPOTHERMIA, INITIAL ENCOUNTER: ICD-10-CM

## 2019-10-01 DIAGNOSIS — K92.2 LOWER GI BLEED: ICD-10-CM

## 2019-10-01 LAB
ACANTHOCYTES: ABNORMAL
ALBUMIN SERPL-MCNC: 2 G/DL (ref 3.5–4.6)
ALP BLD-CCNC: 282 U/L (ref 40–130)
ALT SERPL-CCNC: 85 U/L (ref 0–33)
ANION GAP SERPL CALCULATED.3IONS-SCNC: 17 MEQ/L (ref 9–15)
ANISOCYTOSIS: ABNORMAL
AST SERPL-CCNC: 209 U/L (ref 0–35)
BASOPHILS ABSOLUTE: 0 K/UL (ref 0–0.2)
BASOPHILS RELATIVE PERCENT: 0.3 %
BILIRUB SERPL-MCNC: 2.9 MG/DL (ref 0.2–0.7)
BUN BLDV-MCNC: 51 MG/DL (ref 8–23)
CALCIUM SERPL-MCNC: 8.1 MG/DL (ref 8.5–9.9)
CHLORIDE BLD-SCNC: 101 MEQ/L (ref 95–107)
CO2: 20 MEQ/L (ref 20–31)
CREAT SERPL-MCNC: 3 MG/DL (ref 0.5–0.9)
EKG ATRIAL RATE: 357 BPM
EKG Q-T INTERVAL: 452 MS
EKG QRS DURATION: 102 MS
EKG QTC CALCULATION (BAZETT): 494 MS
EKG R AXIS: 38 DEGREES
EKG T AXIS: 201 DEGREES
EKG VENTRICULAR RATE: 72 BPM
EOSINOPHILS ABSOLUTE: 0.8 K/UL (ref 0–0.7)
EOSINOPHILS RELATIVE PERCENT: 13 %
GFR AFRICAN AMERICAN: 19.1
GFR NON-AFRICAN AMERICAN: 15.7
GLOBULIN: 3.2 G/DL (ref 2.3–3.5)
GLUCOSE BLD-MCNC: 79 MG/DL (ref 70–99)
HCT VFR BLD CALC: 20.4 % (ref 37–47)
HEMOGLOBIN: 6.8 G/DL (ref 12–16)
HYPOCHROMIA: ABNORMAL
LACTIC ACID: 1.4 MMOL/L (ref 0.5–2.2)
LIPASE: 11 U/L (ref 12–95)
LYMPHOCYTES ABSOLUTE: 0.7 K/UL (ref 1–4.8)
LYMPHOCYTES RELATIVE PERCENT: 11 %
MACROCYTES: ABNORMAL
MCH RBC QN AUTO: 37.7 PG (ref 27–31.3)
MCHC RBC AUTO-ENTMCNC: 33.4 % (ref 33–37)
MCV RBC AUTO: 112.8 FL (ref 82–100)
MONOCYTES ABSOLUTE: 0.4 K/UL (ref 0.2–0.8)
MONOCYTES RELATIVE PERCENT: 5.7 %
NEUTROPHILS ABSOLUTE: 4.6 K/UL (ref 1.4–6.5)
NEUTROPHILS RELATIVE PERCENT: 70 %
NUCLEATED RED BLOOD CELLS: 2 /100 WBC
PDW BLD-RTO: 24.3 % (ref 11.5–14.5)
PLATELET # BLD: 112 K/UL (ref 130–400)
PLATELET SLIDE REVIEW: ABNORMAL
POIKILOCYTES: ABNORMAL
POTASSIUM SERPL-SCNC: 4 MEQ/L (ref 3.4–4.9)
PRO-BNP: 1702 PG/ML
RBC # BLD: 1.81 M/UL (ref 4.2–5.4)
SODIUM BLD-SCNC: 138 MEQ/L (ref 135–144)
TARGET CELLS: ABNORMAL
TOTAL PROTEIN: 5.2 G/DL (ref 6.3–8)
WBC # BLD: 6.5 K/UL (ref 4.8–10.8)

## 2019-10-01 PROCEDURE — 83880 ASSAY OF NATRIURETIC PEPTIDE: CPT

## 2019-10-01 PROCEDURE — P9016 RBC LEUKOCYTES REDUCED: HCPCS

## 2019-10-01 PROCEDURE — 86901 BLOOD TYPING SEROLOGIC RH(D): CPT

## 2019-10-01 PROCEDURE — 86923 COMPATIBILITY TEST ELECTRIC: CPT

## 2019-10-01 PROCEDURE — 36430 TRANSFUSION BLD/BLD COMPNT: CPT

## 2019-10-01 PROCEDURE — 86900 BLOOD TYPING SEROLOGIC ABO: CPT

## 2019-10-01 PROCEDURE — 99284 EMERGENCY DEPT VISIT MOD MDM: CPT

## 2019-10-01 PROCEDURE — 86850 RBC ANTIBODY SCREEN: CPT

## 2019-10-01 PROCEDURE — 83605 ASSAY OF LACTIC ACID: CPT

## 2019-10-01 PROCEDURE — 80053 COMPREHEN METABOLIC PANEL: CPT

## 2019-10-01 PROCEDURE — 83690 ASSAY OF LIPASE: CPT

## 2019-10-01 PROCEDURE — 93005 ELECTROCARDIOGRAM TRACING: CPT | Performed by: PERSONAL EMERGENCY RESPONSE ATTENDANT

## 2019-10-01 PROCEDURE — 71045 X-RAY EXAM CHEST 1 VIEW: CPT

## 2019-10-01 PROCEDURE — 36415 COLL VENOUS BLD VENIPUNCTURE: CPT

## 2019-10-01 PROCEDURE — 85025 COMPLETE CBC W/AUTO DIFF WBC: CPT

## 2019-10-01 RX ORDER — 0.9 % SODIUM CHLORIDE 0.9 %
1000 INTRAVENOUS SOLUTION INTRAVENOUS ONCE
Status: DISCONTINUED | OUTPATIENT
Start: 2019-10-01 | End: 2019-10-01

## 2019-10-01 RX ORDER — SODIUM CHLORIDE 9 MG/ML
INJECTION, SOLUTION INTRAVENOUS CONTINUOUS
Status: DISCONTINUED | OUTPATIENT
Start: 2019-10-01 | End: 2019-10-02 | Stop reason: HOSPADM

## 2019-10-01 RX ORDER — 0.9 % SODIUM CHLORIDE 0.9 %
250 INTRAVENOUS SOLUTION INTRAVENOUS ONCE
Status: DISCONTINUED | OUTPATIENT
Start: 2019-10-01 | End: 2019-10-02 | Stop reason: HOSPADM

## 2019-10-01 ASSESSMENT — ENCOUNTER SYMPTOMS
SORE THROAT: 0
BLOOD IN STOOL: 0
COLOR CHANGE: 0
COUGH: 0
VOMITING: 0
DIARRHEA: 0
ABDOMINAL PAIN: 0
SHORTNESS OF BREATH: 0
NAUSEA: 0
RHINORRHEA: 0

## 2019-10-02 VITALS
HEIGHT: 66 IN | WEIGHT: 192.02 LBS | DIASTOLIC BLOOD PRESSURE: 48 MMHG | BODY MASS INDEX: 30.86 KG/M2 | HEART RATE: 84 BPM | SYSTOLIC BLOOD PRESSURE: 85 MMHG | OXYGEN SATURATION: 99 % | TEMPERATURE: 97.3 F | RESPIRATION RATE: 16 BRPM

## 2019-10-02 PROBLEM — F10.10 ALCOHOL ABUSE: Chronic | Status: ACTIVE | Noted: 2019-05-28

## 2019-10-02 PROBLEM — T68.XXXA HYPOTHERMIA: Status: ACTIVE | Noted: 2019-10-02

## 2019-10-02 PROBLEM — N17.9 ACUTE RENAL FAILURE (ARF) (HCC): Status: ACTIVE | Noted: 2019-10-02

## 2019-10-02 PROBLEM — K70.30 ALC (ALCOHOLIC LIVER CIRRHOSIS) (HCC): Status: ACTIVE | Noted: 2019-10-02

## 2019-10-02 PROBLEM — I95.9 HYPOTENSION: Status: ACTIVE | Noted: 2019-10-02

## 2019-10-02 PROBLEM — K76.82 HEPATIC ENCEPHALOPATHY: Status: ACTIVE | Noted: 2019-10-02

## 2019-10-02 PROBLEM — D64.9 ANEMIA: Status: ACTIVE | Noted: 2019-10-02

## 2019-10-02 LAB
ABO/RH: NORMAL
ALBUMIN SERPL-MCNC: 2 G/DL (ref 3.5–4.6)
ALP BLD-CCNC: 260 U/L (ref 40–130)
ALT SERPL-CCNC: 83 U/L (ref 0–33)
AMMONIA: 136 UMOL/L (ref 11–51)
AMMONIA: 158 UMOL/L (ref 11–51)
ANION GAP SERPL CALCULATED.3IONS-SCNC: 16 MEQ/L (ref 9–15)
ANTIBODY SCREEN: NORMAL
AST SERPL-CCNC: 186 U/L (ref 0–35)
BACTERIA: NEGATIVE /HPF
BILIRUB SERPL-MCNC: 2.7 MG/DL (ref 0.2–0.7)
BILIRUBIN URINE: ABNORMAL
BLOOD BANK DISPENSE STATUS: NORMAL
BLOOD BANK PRODUCT CODE: NORMAL
BLOOD, URINE: ABNORMAL
BPU ID: NORMAL
BUN BLDV-MCNC: 51 MG/DL (ref 8–23)
CALCIUM SERPL-MCNC: 7.9 MG/DL (ref 8.5–9.9)
CHLORIDE BLD-SCNC: 106 MEQ/L (ref 95–107)
CLARITY: ABNORMAL
CO2: 19 MEQ/L (ref 20–31)
COLOR: ABNORMAL
CREAT SERPL-MCNC: 3 MG/DL (ref 0.5–0.9)
DESCRIPTION BLOOD BANK: NORMAL
EPITHELIAL CELLS, UA: ABNORMAL /HPF (ref 0–5)
GFR AFRICAN AMERICAN: 19.1
GFR NON-AFRICAN AMERICAN: 15.7
GLOBULIN: 2.6 G/DL (ref 2.3–3.5)
GLUCOSE BLD-MCNC: 68 MG/DL (ref 70–99)
GLUCOSE URINE: NEGATIVE MG/DL
HCT VFR BLD CALC: 22.6 % (ref 37–47)
HEMOGLOBIN: 7.6 G/DL (ref 12–16)
HYALINE CASTS: ABNORMAL /HPF (ref 0–5)
INR BLD: 1.5
KETONES, URINE: ABNORMAL MG/DL
LEUKOCYTE ESTERASE, URINE: ABNORMAL
MCH RBC QN AUTO: 36.5 PG (ref 27–31.3)
MCHC RBC AUTO-ENTMCNC: 33.8 % (ref 33–37)
MCV RBC AUTO: 108.1 FL (ref 82–100)
NITRITE, URINE: NEGATIVE
PDW BLD-RTO: 24.4 % (ref 11.5–14.5)
PH UA: 5 (ref 5–9)
PLATELET # BLD: 100 K/UL (ref 130–400)
POTASSIUM REFLEX MAGNESIUM: 3.6 MEQ/L (ref 3.4–4.9)
PROTEIN UA: ABNORMAL MG/DL
PROTHROMBIN TIME: 18.6 SEC (ref 12.3–14.9)
RBC # BLD: 2.09 M/UL (ref 4.2–5.4)
RBC UA: ABNORMAL /HPF (ref 0–2)
SODIUM BLD-SCNC: 141 MEQ/L (ref 135–144)
SPECIFIC GRAVITY UA: 1.02 (ref 1–1.03)
TOTAL PROTEIN: 4.6 G/DL (ref 6.3–8)
URINE REFLEX TO CULTURE: YES
UROBILINOGEN, URINE: 1 E.U./DL
WBC # BLD: 6.3 K/UL (ref 4.8–10.8)
WBC UA: >100 /HPF (ref 0–5)
YEAST: PRESENT

## 2019-10-02 PROCEDURE — 2500000003 HC RX 250 WO HCPCS: Performed by: INTERNAL MEDICINE

## 2019-10-02 PROCEDURE — 81001 URINALYSIS AUTO W/SCOPE: CPT

## 2019-10-02 PROCEDURE — 96376 TX/PRO/DX INJ SAME DRUG ADON: CPT

## 2019-10-02 PROCEDURE — 36415 COLL VENOUS BLD VENIPUNCTURE: CPT

## 2019-10-02 PROCEDURE — 96374 THER/PROPH/DIAG INJ IV PUSH: CPT

## 2019-10-02 PROCEDURE — 93010 ELECTROCARDIOGRAM REPORT: CPT | Performed by: INTERNAL MEDICINE

## 2019-10-02 PROCEDURE — 99211 OFF/OP EST MAY X REQ PHY/QHP: CPT

## 2019-10-02 PROCEDURE — 6360000002 HC RX W HCPCS: Performed by: HOSPITALIST

## 2019-10-02 PROCEDURE — 2580000003 HC RX 258: Performed by: PERSONAL EMERGENCY RESPONSE ATTENDANT

## 2019-10-02 PROCEDURE — G0378 HOSPITAL OBSERVATION PER HR: HCPCS

## 2019-10-02 PROCEDURE — 80053 COMPREHEN METABOLIC PANEL: CPT

## 2019-10-02 PROCEDURE — 2580000003 HC RX 258: Performed by: HOSPITALIST

## 2019-10-02 PROCEDURE — 6360000002 HC RX W HCPCS: Performed by: INTERNAL MEDICINE

## 2019-10-02 PROCEDURE — 96375 TX/PRO/DX INJ NEW DRUG ADDON: CPT

## 2019-10-02 PROCEDURE — 82140 ASSAY OF AMMONIA: CPT

## 2019-10-02 PROCEDURE — 87086 URINE CULTURE/COLONY COUNT: CPT

## 2019-10-02 PROCEDURE — 85027 COMPLETE CBC AUTOMATED: CPT

## 2019-10-02 PROCEDURE — C9113 INJ PANTOPRAZOLE SODIUM, VIA: HCPCS | Performed by: HOSPITALIST

## 2019-10-02 PROCEDURE — 6370000000 HC RX 637 (ALT 250 FOR IP): Performed by: HOSPITALIST

## 2019-10-02 PROCEDURE — 85610 PROTHROMBIN TIME: CPT

## 2019-10-02 RX ORDER — SODIUM CHLORIDE 0.9 % (FLUSH) 0.9 %
10 SYRINGE (ML) INJECTION PRN
Status: DISCONTINUED | OUTPATIENT
Start: 2019-10-02 | End: 2019-10-02 | Stop reason: HOSPADM

## 2019-10-02 RX ORDER — PANTOPRAZOLE SODIUM 40 MG/10ML
40 INJECTION, POWDER, LYOPHILIZED, FOR SOLUTION INTRAVENOUS 2 TIMES DAILY
Status: DISCONTINUED | OUTPATIENT
Start: 2019-10-02 | End: 2019-10-02 | Stop reason: HOSPADM

## 2019-10-02 RX ORDER — NYSTATIN 10B UNIT
POWDER (EA) MISCELLANEOUS 2 TIMES DAILY
COMMUNITY

## 2019-10-02 RX ORDER — LEVOTHYROXINE SODIUM 0.15 MG/1
150 TABLET ORAL DAILY
Status: DISCONTINUED | OUTPATIENT
Start: 2019-10-02 | End: 2019-10-02 | Stop reason: HOSPADM

## 2019-10-02 RX ORDER — DOCUSATE SODIUM 100 MG/1
100 CAPSULE, LIQUID FILLED ORAL DAILY
COMMUNITY

## 2019-10-02 RX ORDER — LACTULOSE 10 G/15ML
300 SOLUTION ORAL; RECTAL ONCE
Status: DISCONTINUED | OUTPATIENT
Start: 2019-10-02 | End: 2019-10-02

## 2019-10-02 RX ORDER — ONDANSETRON 2 MG/ML
4 INJECTION INTRAMUSCULAR; INTRAVENOUS EVERY 6 HOURS PRN
Status: DISCONTINUED | OUTPATIENT
Start: 2019-10-02 | End: 2019-10-02 | Stop reason: HOSPADM

## 2019-10-02 RX ORDER — FERROUS SULFATE 325(65) MG
325 TABLET ORAL 2 TIMES DAILY
COMMUNITY

## 2019-10-02 RX ORDER — MORPHINE SULFATE 2 MG/ML
0.5 INJECTION, SOLUTION INTRAMUSCULAR; INTRAVENOUS ONCE
Status: COMPLETED | OUTPATIENT
Start: 2019-10-02 | End: 2019-10-02

## 2019-10-02 RX ORDER — PANTOPRAZOLE SODIUM 40 MG/1
40 TABLET, DELAYED RELEASE ORAL
Status: DISCONTINUED | OUTPATIENT
Start: 2019-10-02 | End: 2019-10-02 | Stop reason: ALTCHOICE

## 2019-10-02 RX ORDER — LACTULOSE 10 G/15ML
30 SOLUTION ORAL 3 TIMES DAILY
Status: DISCONTINUED | OUTPATIENT
Start: 2019-10-02 | End: 2019-10-02 | Stop reason: HOSPADM

## 2019-10-02 RX ORDER — ACETAMINOPHEN 325 MG/1
650 TABLET ORAL EVERY 4 HOURS PRN
Status: ON HOLD | COMMUNITY
Start: 2019-08-08 | End: 2019-10-02

## 2019-10-02 RX ORDER — SODIUM CHLORIDE 0.9 % (FLUSH) 0.9 %
10 SYRINGE (ML) INJECTION EVERY 12 HOURS SCHEDULED
Status: DISCONTINUED | OUTPATIENT
Start: 2019-10-02 | End: 2019-10-02 | Stop reason: HOSPADM

## 2019-10-02 RX ORDER — LANOLIN ALCOHOL/MO/W.PET/CERES
3 CREAM (GRAM) TOPICAL NIGHTLY PRN
COMMUNITY

## 2019-10-02 RX ORDER — FUROSEMIDE 20 MG/1
20 TABLET ORAL DAILY
COMMUNITY

## 2019-10-02 RX ORDER — FLUCONAZOLE 200 MG/1
200 TABLET ORAL DAILY
COMMUNITY
Start: 2019-10-01 | End: 2019-10-04

## 2019-10-02 RX ORDER — 0.9 % SODIUM CHLORIDE 0.9 %
10 VIAL (ML) INJECTION 2 TIMES DAILY
Status: DISCONTINUED | OUTPATIENT
Start: 2019-10-02 | End: 2019-10-02 | Stop reason: HOSPADM

## 2019-10-02 RX ADMIN — MICONAZOLE NITRATE: 20 POWDER TOPICAL at 10:18

## 2019-10-02 RX ADMIN — LACTULOSE: 10 SOLUTION ORAL; RECTAL at 05:27

## 2019-10-02 RX ADMIN — Medication 10 ML: at 09:34

## 2019-10-02 RX ADMIN — MORPHINE SULFATE 0.5 MG: 2 INJECTION, SOLUTION INTRAMUSCULAR; INTRAVENOUS at 15:59

## 2019-10-02 RX ADMIN — Medication 10 ML: at 09:35

## 2019-10-02 RX ADMIN — PANTOPRAZOLE SODIUM 40 MG: 40 INJECTION, POWDER, LYOPHILIZED, FOR SOLUTION INTRAVENOUS at 05:26

## 2019-10-02 RX ADMIN — MORPHINE SULFATE 0.5 MG: 2 INJECTION, SOLUTION INTRAMUSCULAR; INTRAVENOUS at 09:34

## 2019-10-02 RX ADMIN — SODIUM CHLORIDE: 9 INJECTION, SOLUTION INTRAVENOUS at 05:26

## 2019-10-02 RX ADMIN — SODIUM CHLORIDE: 9 INJECTION, SOLUTION INTRAVENOUS at 00:19

## 2019-10-02 ASSESSMENT — PAIN SCALES - GENERAL
PAINLEVEL_OUTOF10: 4
PAINLEVEL_OUTOF10: 5
PAINLEVEL_OUTOF10: 0

## 2019-10-03 LAB
ORGANISM: ABNORMAL
URINE CULTURE, ROUTINE: ABNORMAL